# Patient Record
Sex: MALE | Race: BLACK OR AFRICAN AMERICAN | URBAN - METROPOLITAN AREA
[De-identification: names, ages, dates, MRNs, and addresses within clinical notes are randomized per-mention and may not be internally consistent; named-entity substitution may affect disease eponyms.]

---

## 2018-10-30 ENCOUNTER — OFFICE VISIT (OUTPATIENT)
Dept: INTERNAL MEDICINE CLINIC | Facility: CLINIC | Age: 28
End: 2018-10-30
Payer: COMMERCIAL

## 2018-10-30 VITALS
HEART RATE: 75 BPM | SYSTOLIC BLOOD PRESSURE: 118 MMHG | DIASTOLIC BLOOD PRESSURE: 64 MMHG | OXYGEN SATURATION: 98 % | BODY MASS INDEX: 31.32 KG/M2 | HEIGHT: 65 IN | WEIGHT: 188 LBS

## 2018-10-30 DIAGNOSIS — R51.9 HEADACHE DISORDER: ICD-10-CM

## 2018-10-30 DIAGNOSIS — Z00.00 GENERAL MEDICAL EXAM: Primary | ICD-10-CM

## 2018-10-30 DIAGNOSIS — J45.909 MODERATE ASTHMA WITHOUT COMPLICATION, UNSPECIFIED WHETHER PERSISTENT: ICD-10-CM

## 2018-10-30 PROCEDURE — 99213 OFFICE O/P EST LOW 20 MIN: CPT | Performed by: INTERNAL MEDICINE

## 2018-10-30 PROCEDURE — 36415 COLL VENOUS BLD VENIPUNCTURE: CPT | Performed by: INTERNAL MEDICINE

## 2018-10-30 RX ORDER — ALBUTEROL SULFATE 2.5 MG/3ML
SOLUTION RESPIRATORY (INHALATION) EVERY 6 HOURS PRN
COMMUNITY

## 2018-10-30 RX ORDER — NAPROXEN 500 MG/1
500 TABLET ORAL 2 TIMES DAILY WITH MEALS
Qty: 1 TABLET | Refills: 0 | Status: SHIPPED | OUTPATIENT
Start: 2018-10-30 | End: 2018-11-13

## 2018-10-30 RX ORDER — BUDESONIDE AND FORMOTEROL FUMARATE DIHYDRATE 80; 4.5 UG/1; UG/1
2 AEROSOL RESPIRATORY (INHALATION) 2 TIMES DAILY
Qty: 1 INHALER | Refills: 12 | Status: SHIPPED | OUTPATIENT
Start: 2018-10-30 | End: 2019-04-22

## 2018-10-30 RX ORDER — ALBUTEROL SULFATE 90 UG/1
2 AEROSOL, METERED RESPIRATORY (INHALATION) EVERY 6 HOURS PRN
Qty: 1 INHALER | Refills: 12 | Status: SHIPPED | OUTPATIENT
Start: 2018-10-30 | End: 2019-08-26

## 2018-10-30 RX ORDER — NAPROXEN 500 MG/1
500 TABLET ORAL 2 TIMES DAILY WITH MEALS
COMMUNITY
End: 2018-10-30

## 2018-10-30 RX ORDER — ALBUTEROL SULFATE 90 UG/1
2 AEROSOL, METERED RESPIRATORY (INHALATION) EVERY 6 HOURS PRN
COMMUNITY
End: 2018-10-30

## 2018-10-30 NOTE — PROGRESS NOTES
Opal Gallegos is a 34year old male. Patient presents with:  Establish Care  Lab: would like to get checked ffor STDs    HPI:   53-year-old male with a past medical history of asthma, stress headache here to establish care.   Patient was seeing me in [facility] Psychiatric/Behavioral: Negative for behavioral problems. Wt Readings from Last 5 Encounters:  10/30/18 : 188 lb (85.3 kg)    Body mass index is 31.28 kg/m².       EXAM:   /64 (BP Location: Left arm, Patient Position: Sitting, Cuff Size: adult) MCG/ACT Inhalation Aero Soln; Inhale 2 puffs into the lungs every 6 (six) hours as needed for Wheezing.  -     Budesonide-Formoterol Fumarate (SYMBICORT) 80-4.5 MCG/ACT Inhalation Aerosol; Inhale 2 puffs into the lungs 2 (two) times daily.       Plan: As ab

## 2018-10-30 NOTE — PATIENT INSTRUCTIONS
Please take the medications as prescribed  I started you on a new inhaler called Symbicort, please use that inhaler 2 puff in the morning and 2 puff at night irrespective of how you feel.   Please use the rescue inhaler albuterol when you need it  Asthma Tr windows in your home or car. · Have someone else do yard work, if possible.      Cockroaches. Roaches are found in many homes and produce allergens. · Keep your kitchen clean and dry. A leaky faucet or drain can attract roaches.   · Remove garbage from yo indoors. · Use pump spray bottles instead of aerosols. · Pour liquid  onto a rag or cloth instead of spraying them.      Weather. [place] them worse.   · Watch for very high or low temperatures, very humid c not been exercising regularly, start slow and work up gradually. · Take all of your medicines as prescribed. · If you use quick-relief medicine, make sure you have it with you when you exercise.   · Stop if you have any symptoms. Make sure you talk with y

## 2018-11-13 ENCOUNTER — TELEPHONE (OUTPATIENT)
Dept: INTERNAL MEDICINE CLINIC | Facility: CLINIC | Age: 28
End: 2018-11-13

## 2018-11-13 RX ORDER — NAPROXEN 500 MG/1
500 TABLET ORAL 2 TIMES DAILY WITH MEALS
Qty: 1 TABLET | Refills: 0 | Status: SHIPPED | OUTPATIENT
Start: 2018-11-13 | End: 2019-01-18

## 2018-11-13 NOTE — TELEPHONE ENCOUNTER
Current Outpatient Medications:     •  naproxen 500 MG Oral Tab, Take 1 tablet (500 mg total) by mouth 2 (two) times  daily with meals. , Disp: 1 tablet, Rfl: 0 REFILL

## 2019-01-02 NOTE — PROGRESS NOTES
Anastasiya Olivas is a 34year old male. Patient presents with:  Lab: pt states he is here for lab work, STDs  Patent had unprotected sex with another person he wants to be tested  HPI:   27-year-old male here for evaluation of STDs.   Patient reports that his Wt Readings from Last 5 Encounters:  01/02/19 : 194 lb (88 kg)  10/30/18 : 188 lb (85.3 kg)    Body mass index is 32.28 kg/m².       EXAM:   /72 (BP Location: Left arm, Patient Position: Sitting, Cuff Size: adult)   Pulse 81   Ht 5' 5\" (1.651 m)

## 2019-01-21 RX ORDER — NAPROXEN 500 MG/1
500 TABLET ORAL 2 TIMES DAILY WITH MEALS
Qty: 1 TABLET | Refills: 0 | Status: SHIPPED | OUTPATIENT
Start: 2019-01-21 | End: 2019-08-26

## 2019-02-15 ENCOUNTER — TELEPHONE (OUTPATIENT)
Dept: INTERNAL MEDICINE CLINIC | Facility: CLINIC | Age: 29
End: 2019-02-15

## 2019-04-18 ENCOUNTER — TELEPHONE (OUTPATIENT)
Dept: ADMINISTRATIVE | Age: 29
End: 2019-04-18

## 2019-04-18 ENCOUNTER — TELEPHONE (OUTPATIENT)
Dept: INTERNAL MEDICINE CLINIC | Facility: CLINIC | Age: 29
End: 2019-04-18

## 2019-04-18 NOTE — TELEPHONE ENCOUNTER
FMLA received via fax from [address]. Spoke to pt today and told him that a current appt will be needed before the FMLA can be started. Will need HIPAA/Fee. Emailed HIPAA packet to pt: [email or web address]

## 2019-04-22 ENCOUNTER — TELEPHONE (OUTPATIENT)
Dept: INTERNAL MEDICINE CLINIC | Facility: CLINIC | Age: 29
End: 2019-04-22

## 2019-04-22 ENCOUNTER — OFFICE VISIT (OUTPATIENT)
Dept: INTERNAL MEDICINE CLINIC | Facility: CLINIC | Age: 29
End: 2019-04-22
Payer: COMMERCIAL

## 2019-04-22 VITALS
OXYGEN SATURATION: 97 % | SYSTOLIC BLOOD PRESSURE: 153 MMHG | WEIGHT: 188 LBS | BODY MASS INDEX: 31.32 KG/M2 | HEART RATE: 77 BPM | HEIGHT: 65 IN | DIASTOLIC BLOOD PRESSURE: 70 MMHG

## 2019-04-22 DIAGNOSIS — I10 ESSENTIAL HYPERTENSION: Primary | ICD-10-CM

## 2019-04-22 PROBLEM — G43.009 MIGRAINE WITHOUT AURA AND WITHOUT STATUS MIGRAINOSUS, NOT INTRACTABLE: Status: ACTIVE | Noted: 2019-04-22

## 2019-04-22 PROCEDURE — 99213 OFFICE O/P EST LOW 20 MIN: CPT | Performed by: INTERNAL MEDICINE

## 2019-04-22 RX ORDER — AMLODIPINE BESYLATE 2.5 MG/1
2.5 TABLET ORAL DAILY
Qty: 90 TABLET | Refills: 0 | Status: SHIPPED | OUTPATIENT
Start: 2019-04-22 | End: 2019-07-18

## 2019-04-22 NOTE — TELEPHONE ENCOUNTER
[place] paper work completed by Dr Lyubov Covarrubias per patient to be faxed to: [facility]  Fax# [phone]    Faxed with confirmation sent to scanning.

## 2019-04-22 NOTE — PATIENT INSTRUCTIONS
As discussed, when I rechecked your blood pressure it was 144/94. Please avoid salt as much as possible. Exercise for 30-40 minutes 3-4 times a week. I have given you a medicine called amlodipine. Please take it once a day.     I will see you back in 3

## 2019-04-22 NOTE — PROGRESS NOTES
Kerry Pearson is a 34year old male. Patient presents with:  Headache: daily headaches for one week  Complete Form: needs FMLA paper filled out    HPI:   73-year-old male with history of migraine headaches, asthma here for follow-up.   He reports that he c problems. Wt Readings from Last 5 Encounters:  04/22/19 : 188 lb (85.3 kg)  01/02/19 : 194 lb (88 kg)  10/30/18 : 188 lb (85.3 kg)    Body mass index is 31.28 kg/m².       EXAM:   /70 (BP Location: Right arm, Patient Position: Sitting, Cuff Size:

## 2019-05-30 ENCOUNTER — OFFICE VISIT (OUTPATIENT)
Dept: INTERNAL MEDICINE CLINIC | Facility: CLINIC | Age: 29
End: 2019-05-30
Payer: COMMERCIAL

## 2019-05-30 VITALS
TEMPERATURE: 98 F | HEART RATE: 68 BPM | HEIGHT: 65 IN | WEIGHT: 189 LBS | BODY MASS INDEX: 31.49 KG/M2 | DIASTOLIC BLOOD PRESSURE: 75 MMHG | SYSTOLIC BLOOD PRESSURE: 127 MMHG

## 2019-05-30 DIAGNOSIS — R30.0 DYSURIA: Primary | ICD-10-CM

## 2019-05-30 PROBLEM — R51.9 HEADACHE DISORDER: Status: RESOLVED | Noted: 2018-10-30 | Resolved: 2019-05-30

## 2019-05-30 PROBLEM — Z00.00 GENERAL MEDICAL EXAM: Status: RESOLVED | Noted: 2018-10-30 | Resolved: 2019-05-30

## 2019-05-30 PROCEDURE — 81003 URINALYSIS AUTO W/O SCOPE: CPT | Performed by: INTERNAL MEDICINE

## 2019-05-30 PROCEDURE — 99213 OFFICE O/P EST LOW 20 MIN: CPT | Performed by: INTERNAL MEDICINE

## 2019-05-30 NOTE — PROGRESS NOTES
Jasmina Bernard is a 34year old male. Patient presents with:  Painful Urination: onset one week ago    HPI:   16-year-old gentleman with a past medical history of migraine, hypertension here for evaluation of dysuria.   He reports that he started to have bu dysuria. Negative for bladder incontinence, hematuria and flank pain. Skin: Negative for wound. Psychiatric/Behavioral: Negative for behavioral problems.      Wt Readings from Last 5 Encounters:  05/30/19 : 189 lb (85.7 kg)  04/22/19 : 188 lb (85.3 kg)

## 2019-06-19 ENCOUNTER — PATIENT MESSAGE (OUTPATIENT)
Dept: INTERNAL MEDICINE CLINIC | Facility: CLINIC | Age: 29
End: 2019-06-19

## 2019-06-20 RX ORDER — METRONIDAZOLE 500 MG/1
TABLET ORAL
Qty: 4 TABLET | Refills: 0 | Status: SHIPPED | OUTPATIENT
Start: 2019-06-20 | End: 2019-08-28

## 2019-06-20 NOTE — TELEPHONE ENCOUNTER
From: Ruby Kong  To: Macy Blas MD  Sent: 6/19/2019 1:29 PM CDT  Subject: Prescription Question    Good afternoon can you write me an prescription for the metronidazole again for trichomoniasis and also my last visit we talked about the bacteri

## 2019-07-18 RX ORDER — AMLODIPINE BESYLATE 2.5 MG/1
TABLET ORAL
Qty: 90 TABLET | Refills: 1 | Status: SHIPPED | OUTPATIENT
Start: 2019-07-18 | End: 2019-11-27

## 2019-07-18 NOTE — TELEPHONE ENCOUNTER
Refill passed per [facility] protocol.   Hypertensive Medications  Protocol Criteria:  · Appointment scheduled in the past 6 months or in the next 3 months  · BMP or CMP in the past 12 months  · Creatinine result < 2  Recent Outpatient Visits

## 2019-08-26 ENCOUNTER — TELEPHONE (OUTPATIENT)
Dept: INTERNAL MEDICINE CLINIC | Facility: CLINIC | Age: 29
End: 2019-08-26

## 2019-08-27 ENCOUNTER — PATIENT MESSAGE (OUTPATIENT)
Dept: INTERNAL MEDICINE CLINIC | Facility: CLINIC | Age: 29
End: 2019-08-27

## 2019-08-27 RX ORDER — ALBUTEROL SULFATE 90 UG/1
2 AEROSOL, METERED RESPIRATORY (INHALATION) EVERY 6 HOURS PRN
Qty: 3 INHALER | Refills: 1 | Status: SHIPPED | OUTPATIENT
Start: 2019-08-27 | End: 2020-01-13

## 2019-08-28 RX ORDER — METRONIDAZOLE 500 MG/1
TABLET ORAL
Qty: 4 TABLET | Refills: 0 | Status: SHIPPED | OUTPATIENT
Start: 2019-08-28 | End: 2019-11-27 | Stop reason: ALTCHOICE

## 2019-08-28 RX ORDER — NAPROXEN 500 MG/1
500 TABLET ORAL 2 TIMES DAILY WITH MEALS
Qty: 1 TABLET | Refills: 0 | Status: SHIPPED | OUTPATIENT
Start: 2019-08-28 | End: 2019-09-05

## 2019-08-28 NOTE — TELEPHONE ENCOUNTER
Refill passed per [facility] protocol.   Refill Protocol Appointment Criteria  · Appointment scheduled in the past 6 months or in the next 3 months  Recent Outpatient Visits            2 months ago [address], [address] [facility], [facility]

## 2019-08-28 NOTE — TELEPHONE ENCOUNTER
From: Toni Lagunas  To: Jai Askew MD  Sent: 8/27/2019 7:23 PM CDT  Subject: Prescription Question    Good Evening my girl friend have received her results from her gynecologist today and it said that she has trichomonas and that I need to be test

## 2019-08-28 NOTE — TELEPHONE ENCOUNTER
Please advise in regards to refill request. Thank You  Refill Protocol Appointment Criteria  · Appointment scheduled in the past 6 months or in the next 3 months  Recent Outpatient Visits            2 months ago [address], [address] [place] [address]

## 2019-08-28 NOTE — TELEPHONE ENCOUNTER
Dr. Michael Krishnan: patient uploaded a copy of his girlfriend's results. He asked if you can treat him?

## 2019-08-28 NOTE — TELEPHONE ENCOUNTER
I have sent the prescription to the pharmacy. He should take Flagyl 500 mg, take 4 tablets once. Please advise patient to use condoms when sexually active.   Thank you

## 2019-08-29 NOTE — TELEPHONE ENCOUNTER
Pharmacy sent in form asking for clarifications on Naproxen - Only dispense ONE tablet? Not 60 tablets? They received rx for; Naproxen 500mg 1 tab PO BID w/meals Quantity: 1tablet No refills. Please advise.

## 2019-08-29 NOTE — TELEPHONE ENCOUNTER
Spoke to RPh: Fausto Escalera to clarify that script was to be for 60tablets. RPh confirmed Naproxen 500mg 1 tab PO BID w/meals #60 w/0refills. They will get this ready for the pt.

## 2019-09-01 ENCOUNTER — TELEPHONE (OUTPATIENT)
Dept: OTHER | Age: 29
End: 2019-09-01

## 2019-09-02 NOTE — TELEPHONE ENCOUNTER
RN please contact patient to assess    Marianne Abdullahi MD 9 hours ago (12:17 PM)         Goodafternoon I been having the runs since yesterday I didn't go to the Emergency room because I didn't know how severe it was.

## 2019-09-03 NOTE — TELEPHONE ENCOUNTER
How is he doing? Can you please get me more info. He should be well hydrated .  Please let me know Thanks  Brown Keating

## 2019-09-04 NOTE — TELEPHONE ENCOUNTER
FERNIETCYUMIKO. Pt has viewed my chart message requesting he contact the office.       Keith Delatorre RN   to Teressa Heath           9/1/19 9:54 PM   Thank you for reaching out to our office regarding your symptoms. [facility] office is currently closed an

## 2019-09-05 RX ORDER — NAPROXEN 500 MG/1
500 TABLET ORAL 2 TIMES DAILY WITH MEALS
Qty: 90 TABLET | Refills: 1 | Status: SHIPPED | OUTPATIENT
Start: 2019-09-05 | End: 2019-11-27

## 2019-09-05 NOTE — TELEPHONE ENCOUNTER
Patient returned call, stated \"I'm doing fine now. My diarrhea is gone, maybe it was a medicine or something I ate. Oh, and can someone send Naproxen to my pharmacy because someone sent it but only on pill? \"    Script pended, please advise thanks

## 2019-11-27 ENCOUNTER — MED REC SCAN ONLY (OUTPATIENT)
Dept: INTERNAL MEDICINE CLINIC | Facility: CLINIC | Age: 29
End: 2019-11-27

## 2019-11-27 ENCOUNTER — OFFICE VISIT (OUTPATIENT)
Dept: INTERNAL MEDICINE CLINIC | Facility: CLINIC | Age: 29
End: 2019-11-27
Payer: COMMERCIAL

## 2019-11-27 VITALS
HEART RATE: 84 BPM | SYSTOLIC BLOOD PRESSURE: 131 MMHG | DIASTOLIC BLOOD PRESSURE: 71 MMHG | HEIGHT: 65 IN | BODY MASS INDEX: 32.15 KG/M2 | WEIGHT: 193 LBS | OXYGEN SATURATION: 94 %

## 2019-11-27 DIAGNOSIS — G43.009 MIGRAINE WITHOUT AURA AND WITHOUT STATUS MIGRAINOSUS, NOT INTRACTABLE: Primary | ICD-10-CM

## 2019-11-27 DIAGNOSIS — I10 ESSENTIAL HYPERTENSION: ICD-10-CM

## 2019-11-27 PROCEDURE — 99213 OFFICE O/P EST LOW 20 MIN: CPT | Performed by: INTERNAL MEDICINE

## 2019-11-27 RX ORDER — NAPROXEN 500 MG/1
500 TABLET ORAL 2 TIMES DAILY WITH MEALS
Qty: 90 TABLET | Refills: 1 | Status: SHIPPED | OUTPATIENT
Start: 2019-11-27 | End: 2020-01-13

## 2019-11-27 RX ORDER — AMLODIPINE BESYLATE 2.5 MG/1
2.5 TABLET ORAL
Qty: 90 TABLET | Refills: 1 | Status: SHIPPED | OUTPATIENT
Start: 2019-11-27 | End: 2020-04-21

## 2019-11-27 NOTE — PROGRESS NOTES
Radha Baeza is a 34year old male. Patient presents with:  Hypertension  Complete Form: FMLA  Headache: onset 6 days ago    HPI:   68-year-old male with a past medical history of migraine headaches, hypertension here for follow-up.   He reports that he c breath. Cardiovascular: Negative for chest pain. Gastrointestinal: Negative for vomiting, abdominal pain and abdominal distention. Genitourinary: Negative for hematuria. Skin: Negative for wound. Neurological: Positive for headaches.    Psychiatr

## 2019-12-08 ENCOUNTER — PATIENT MESSAGE (OUTPATIENT)
Dept: INTERNAL MEDICINE CLINIC | Facility: CLINIC | Age: 29
End: 2019-12-08

## 2019-12-09 NOTE — TELEPHONE ENCOUNTER
From: Jasmina Bernard  To: Parviz Munoz MD  Sent: 12/8/2019 9:17 AM CST  Subject: Other    Good morning can I get all my grandmother Frank Stanley prescriptions refilled for her to go to [facility] this week I couldn't ask in my chart for her because I didn

## 2020-01-14 RX ORDER — ALBUTEROL SULFATE 90 UG/1
2 AEROSOL, METERED RESPIRATORY (INHALATION) EVERY 6 HOURS PRN
Qty: 3 INHALER | Refills: 1 | Status: SHIPPED | OUTPATIENT
Start: 2020-01-14 | End: 2020-08-19

## 2020-01-14 RX ORDER — NAPROXEN 500 MG/1
500 TABLET ORAL 2 TIMES DAILY WITH MEALS
Qty: 90 TABLET | Refills: 1 | Status: SHIPPED | OUTPATIENT
Start: 2020-01-14 | End: 2020-04-21

## 2020-01-14 NOTE — TELEPHONE ENCOUNTER
11-27-19 # 90 + 1    Refill Protocol Appointment Criteria  · Appointment scheduled in the past 12 months or in the next 3 months  Recent Outpatient Visits            1 month ago Migraine without aura and without status migrainosus, not intractable    El

## 2020-02-19 ENCOUNTER — OFFICE VISIT (OUTPATIENT)
Dept: INTERNAL MEDICINE CLINIC | Facility: CLINIC | Age: 30
End: 2020-02-19
Payer: COMMERCIAL

## 2020-02-19 VITALS
HEART RATE: 64 BPM | TEMPERATURE: 98 F | HEIGHT: 65 IN | OXYGEN SATURATION: 97 % | DIASTOLIC BLOOD PRESSURE: 81 MMHG | WEIGHT: 190 LBS | SYSTOLIC BLOOD PRESSURE: 137 MMHG | BODY MASS INDEX: 31.65 KG/M2

## 2020-02-19 DIAGNOSIS — Z00.00 ADULT GENERAL MEDICAL EXAM: ICD-10-CM

## 2020-02-19 DIAGNOSIS — Z23 NEED FOR DTAP VACCINATION: ICD-10-CM

## 2020-02-19 DIAGNOSIS — I10 ESSENTIAL HYPERTENSION: Primary | ICD-10-CM

## 2020-02-19 DIAGNOSIS — G43.009 MIGRAINE WITHOUT AURA AND WITHOUT STATUS MIGRAINOSUS, NOT INTRACTABLE: ICD-10-CM

## 2020-02-19 DIAGNOSIS — J45.909 MODERATE ASTHMA WITHOUT COMPLICATION, UNSPECIFIED WHETHER PERSISTENT: ICD-10-CM

## 2020-02-19 PROCEDURE — 90471 IMMUNIZATION ADMIN: CPT | Performed by: INTERNAL MEDICINE

## 2020-02-19 PROCEDURE — 90715 TDAP VACCINE 7 YRS/> IM: CPT | Performed by: INTERNAL MEDICINE

## 2020-02-19 PROCEDURE — 99395 PREV VISIT EST AGE 18-39: CPT | Performed by: INTERNAL MEDICINE

## 2020-02-19 NOTE — PROGRESS NOTES
Carlos Dickens is a 31year old male. Patient presents with:  Physical    HPI:   82-year-old gentleman with past medical history of migraine, asthma here for physical.  He has no complaints. He only takes his pro-air inhaler only during flareups.   He does Constitutional: Negative for appetite change, fever and unexpected weight change. HENT: Negative for congestion, ear pain, nosebleeds and sore throat. Eyes: Negative for pain. Respiratory: Negative for cough, chest tightness and wheezing.     Josiah [place] Abdominal: Soft. Bowel sounds are normal. He exhibits no distension. There is no tenderness. There is no guarding. Lymphadenopathy:     He has no cervical adenopathy. Neurological: He is alert and oriented to person, place, and time.  He has normal st

## 2020-02-19 NOTE — PATIENT INSTRUCTIONS
ASSESSMENT AND PLAN:   1. Adult general medical exam  Encourage patient to eat healthy with fruits and vegetables. Avoid too much of sweets and carbohydrates. I encourage patient exercise for 30 to 40 minutes 3-4 times a week. Screenings: None.   Vac

## 2020-04-13 ENCOUNTER — TELEPHONE (OUTPATIENT)
Dept: INTERNAL MEDICINE CLINIC | Facility: CLINIC | Age: 30
End: 2020-04-13

## 2020-04-13 NOTE — TELEPHONE ENCOUNTER
Pt will be emailing [facility] recert forms. Would like it faxed when completed to his HR and then attach copy to his [employer]. HIPAA on file til 4/22/2020.

## 2020-04-21 RX ORDER — AMLODIPINE BESYLATE 2.5 MG/1
2.5 TABLET ORAL
Qty: 90 TABLET | Refills: 1 | Status: SHIPPED | OUTPATIENT
Start: 2020-04-21 | End: 2020-10-30

## 2020-04-21 RX ORDER — NAPROXEN 500 MG/1
500 TABLET ORAL 2 TIMES DAILY WITH MEALS
Qty: 90 TABLET | Refills: 1 | Status: SHIPPED | OUTPATIENT
Start: 2020-04-21 | End: 2020-08-19

## 2020-04-27 NOTE — TELEPHONE ENCOUNTER
Dr. Charles Hernandez,    Please sign off on form:  Re Cert FMLA - Migraines 1-2 flare ups per month    -Highlight the patient and hit \"Chart\" button. -In Chart Review, w/in the Encounter tab - click 1 time on the Telephone call encounter for 4/13/2020.  Scroll do

## 2020-04-27 NOTE — TELEPHONE ENCOUNTER
AGNIESZKA completed and faxed to [address] [place] [address] [phone].  Sent pt [employer] message

## 2020-05-22 ENCOUNTER — PATIENT MESSAGE (OUTPATIENT)
Dept: INTERNAL MEDICINE CLINIC | Facility: CLINIC | Age: 30
End: 2020-05-22

## 2020-05-22 ENCOUNTER — NURSE TRIAGE (OUTPATIENT)
Dept: INTERNAL MEDICINE CLINIC | Facility: CLINIC | Age: 30
End: 2020-05-22

## 2020-05-22 RX ORDER — IBUPROFEN 800 MG/1
800 TABLET ORAL EVERY 12 HOURS PRN
Qty: 10 TABLET | Refills: 0 | Status: SHIPPED | OUTPATIENT
Start: 2020-05-22 | End: 2020-05-27

## 2020-05-22 NOTE — TELEPHONE ENCOUNTER
From: Herlinda Bruno  To: Sabra Randall MD  Sent: 5/22/2020 12:11 PM CDT  Subject: Prescription Question    Good Afternoon i have severe pain in my teeth my dentist is closed due to the virus can I get a prescription for some pain meds.

## 2020-05-22 NOTE — TELEPHONE ENCOUNTER
Please triage.  Thank you.  ----- Message from Tanisha Benítez sent at 5/22/2020 12:11 PM CDT -----  Regarding: Prescription Question  Contact: [phone]  Good Afternoon i have severe pain in my teeth my dentist is closed due to the virus can I get a pre

## 2020-05-22 NOTE — TELEPHONE ENCOUNTER
Motrin 800 mg to take every 12 hours on an as-needed basis for pain sent to the pharmacy. Dispense 10 -  tablets please make sure that he eats something before he takes the medicine.

## 2020-05-22 NOTE — TELEPHONE ENCOUNTER
Action Requested: Summary for Provider     []  Critical Lab, Recommendations Needed  [x] Need Additional Advice  []   FYI    []   Need Orders  [x] Need Medications Sent to Pharmacy  []  Other     SUMMARY:   Spoke with pt,  verified, pt c/o tooth ache fo

## 2020-08-20 RX ORDER — ALBUTEROL SULFATE 90 UG/1
2 AEROSOL, METERED RESPIRATORY (INHALATION) EVERY 6 HOURS PRN
Qty: 3 INHALER | Refills: 1 | Status: SHIPPED | OUTPATIENT
Start: 2020-08-20 | End: 2021-01-06

## 2020-08-20 RX ORDER — NAPROXEN 500 MG/1
500 TABLET ORAL 2 TIMES DAILY WITH MEALS
Qty: 90 TABLET | Refills: 1 | Status: SHIPPED | OUTPATIENT
Start: 2020-08-20 | End: 2020-10-29

## 2020-10-08 ENCOUNTER — OFFICE VISIT (OUTPATIENT)
Dept: INTERNAL MEDICINE CLINIC | Facility: CLINIC | Age: 30
End: 2020-10-08
Payer: COMMERCIAL

## 2020-10-08 ENCOUNTER — MED REC SCAN ONLY (OUTPATIENT)
Dept: INTERNAL MEDICINE CLINIC | Facility: CLINIC | Age: 30
End: 2020-10-08

## 2020-10-08 VITALS
BODY MASS INDEX: 32.99 KG/M2 | HEART RATE: 69 BPM | WEIGHT: 198 LBS | DIASTOLIC BLOOD PRESSURE: 75 MMHG | HEIGHT: 65 IN | OXYGEN SATURATION: 98 % | SYSTOLIC BLOOD PRESSURE: 131 MMHG | TEMPERATURE: 98 F

## 2020-10-08 DIAGNOSIS — Z00.00 ADULT GENERAL MEDICAL EXAM: Primary | ICD-10-CM

## 2020-10-08 DIAGNOSIS — Z23 NEED FOR PNEUMOCOCCAL VACCINATION: ICD-10-CM

## 2020-10-08 DIAGNOSIS — Z23 NEED FOR INFLUENZA VACCINATION: ICD-10-CM

## 2020-10-08 PROCEDURE — 99395 PREV VISIT EST AGE 18-39: CPT | Performed by: INTERNAL MEDICINE

## 2020-10-08 PROCEDURE — 3008F BODY MASS INDEX DOCD: CPT | Performed by: INTERNAL MEDICINE

## 2020-10-08 PROCEDURE — 90471 IMMUNIZATION ADMIN: CPT | Performed by: INTERNAL MEDICINE

## 2020-10-08 PROCEDURE — 90732 PPSV23 VACC 2 YRS+ SUBQ/IM: CPT | Performed by: INTERNAL MEDICINE

## 2020-10-08 PROCEDURE — 90686 IIV4 VACC NO PRSV 0.5 ML IM: CPT | Performed by: INTERNAL MEDICINE

## 2020-10-08 PROCEDURE — 3078F DIAST BP <80 MM HG: CPT | Performed by: INTERNAL MEDICINE

## 2020-10-08 PROCEDURE — 90472 IMMUNIZATION ADMIN EACH ADD: CPT | Performed by: INTERNAL MEDICINE

## 2020-10-08 PROCEDURE — 3075F SYST BP GE 130 - 139MM HG: CPT | Performed by: INTERNAL MEDICINE

## 2020-10-08 NOTE — PROGRESS NOTES
Dontrell Ames is a 31year old male. Patient presents with:  Physical: work/forms    HPI:   51-year-old gentleman with past medical history of asthma, migraine, hypertension here for physical.  He has no complaints.   He continues to see neurology at [facility] REVIEW OF SYSTEMS:     Review of Systems   Constitutional: Negative for appetite change, fever and unexpected weight change. HENT: Negative for congestion, ear pain, nosebleeds and sore throat. Eyes: Negative for pain.    Respiratory: Negative for sounds are normal. He exhibits no distension. There is no abdominal tenderness. There is no rebound and no guarding. Lymphadenopathy:     He has no cervical adenopathy. Neurological: He is alert and oriented to person, place, and time.  No cranial nerve

## 2020-10-29 ENCOUNTER — NURSE TRIAGE (OUTPATIENT)
Dept: INTERNAL MEDICINE CLINIC | Facility: CLINIC | Age: 30
End: 2020-10-29

## 2020-10-29 ENCOUNTER — PATIENT MESSAGE (OUTPATIENT)
Dept: INTERNAL MEDICINE CLINIC | Facility: CLINIC | Age: 30
End: 2020-10-29

## 2020-10-29 RX ORDER — PENICILLIN V POTASSIUM 500 MG/1
500 TABLET ORAL 4 TIMES DAILY
Qty: 28 TABLET | Refills: 0 | Status: SHIPPED | OUTPATIENT
Start: 2020-10-29 | End: 2021-01-19

## 2020-10-29 NOTE — TELEPHONE ENCOUNTER
Action Requested: Summary for Provider     []  Critical Lab, Recommendations Needed  [x] Need Additional Advice  []   FYI    []   Need Orders  [x] Need Medications Sent to Pharmacy  []  Other     SUMMARY:   Spoke with pt,  verified, pt stated he has too

## 2020-10-29 NOTE — TELEPHONE ENCOUNTER
Patient informed of provider's response below that Rx was sent as requested. Pt thankful and denies further concerns at this time.

## 2020-10-29 NOTE — TELEPHONE ENCOUNTER
From: Salvatore Freeman  To: Eli Sheppard MD  Sent: 10/29/2020 7:41 AM CDT  Subject: Prescription Question    Good morning my dentist off gave me a prescription for penicillin Vk 500 MG 28 tablets in July for tooth infection my apt isn't until November 25

## 2020-10-29 NOTE — TELEPHONE ENCOUNTER
----- Message from Anastasiya Olivas sent at 10/29/2020  7:41 AM CDT -----  Regarding: Prescription Question  Contact: [phone]  Good morning my dentist off gave me a prescription for penicillin Vk 500 MG 28 tablets in July for tooth infection my apt isn

## 2020-10-30 RX ORDER — AMLODIPINE BESYLATE 2.5 MG/1
TABLET ORAL
Qty: 90 TABLET | Refills: 1 | Status: SHIPPED | OUTPATIENT
Start: 2020-10-30 | End: 2021-04-15

## 2020-10-30 RX ORDER — NAPROXEN 500 MG/1
500 TABLET ORAL 2 TIMES DAILY WITH MEALS
Qty: 90 TABLET | Refills: 1 | Status: SHIPPED | OUTPATIENT
Start: 2020-10-30 | End: 2021-01-19

## 2020-11-03 NOTE — PATIENT INSTRUCTIONS
7 11 19 LUCENTIS AND REPEATE IN 9 WKS - MILD EDEMA AT 13 WKS - RETX AND SHORTEN F/U BACK TO 9 WKS. As discussed, your urine test did not show any blood or infection. Please continue to drink plenty of fluids. If your symptoms did not resolve in the next 1 week, please let me know.   If everything is good, I will see you back in October for a physical.

## 2021-01-06 RX ORDER — ALBUTEROL SULFATE 90 UG/1
2 AEROSOL, METERED RESPIRATORY (INHALATION) EVERY 6 HOURS PRN
Qty: 3 INHALER | Refills: 1 | Status: SHIPPED | OUTPATIENT
Start: 2021-01-06 | End: 2021-05-24

## 2021-01-19 ENCOUNTER — OFFICE VISIT (OUTPATIENT)
Dept: INTERNAL MEDICINE CLINIC | Facility: CLINIC | Age: 31
End: 2021-01-19
Payer: COMMERCIAL

## 2021-01-19 VITALS
DIASTOLIC BLOOD PRESSURE: 70 MMHG | HEART RATE: 74 BPM | RESPIRATION RATE: 14 BRPM | OXYGEN SATURATION: 98 % | HEIGHT: 65 IN | WEIGHT: 202 LBS | BODY MASS INDEX: 33.66 KG/M2 | SYSTOLIC BLOOD PRESSURE: 120 MMHG

## 2021-01-19 DIAGNOSIS — R09.82 PND (POST-NASAL DRIP): Primary | ICD-10-CM

## 2021-01-19 PROCEDURE — 3074F SYST BP LT 130 MM HG: CPT | Performed by: INTERNAL MEDICINE

## 2021-01-19 PROCEDURE — 3078F DIAST BP <80 MM HG: CPT | Performed by: INTERNAL MEDICINE

## 2021-01-19 PROCEDURE — 99213 OFFICE O/P EST LOW 20 MIN: CPT | Performed by: INTERNAL MEDICINE

## 2021-01-19 PROCEDURE — 3008F BODY MASS INDEX DOCD: CPT | Performed by: INTERNAL MEDICINE

## 2021-01-19 RX ORDER — NAPROXEN 500 MG/1
500 TABLET ORAL 2 TIMES DAILY WITH MEALS
Qty: 60 TABLET | Refills: 0 | Status: SHIPPED | OUTPATIENT
Start: 2021-01-19 | End: 2021-02-18

## 2021-01-19 RX ORDER — MONTELUKAST SODIUM 10 MG/1
10 TABLET ORAL DAILY
Qty: 30 TABLET | Refills: 0 | Status: SHIPPED | OUTPATIENT
Start: 2021-01-19 | End: 2021-02-10

## 2021-01-19 NOTE — PROGRESS NOTES
Dontrell Ames is a 31year old male. Patient presents with:  Chest Pain: since 2 weeks ago     HPI:   62-year-old gentleman here for evaluation of foreign body sensation in the throat.   He reports that for the last 2 weeks he started to have a feeling of shortness of breath. Cardiovascular: Negative for chest pain. Gastrointestinal: Negative for vomiting, abdominal pain and abdominal distention. Genitourinary: Negative for hematuria. Skin: Negative for wound.    Psychiatric/Behavioral: Negative for

## 2021-02-10 RX ORDER — MONTELUKAST SODIUM 10 MG/1
TABLET ORAL
Qty: 30 TABLET | Refills: 0 | Status: SHIPPED | OUTPATIENT
Start: 2021-02-10 | End: 2021-04-22 | Stop reason: ALTCHOICE

## 2021-02-18 ENCOUNTER — PATIENT MESSAGE (OUTPATIENT)
Dept: INTERNAL MEDICINE CLINIC | Facility: CLINIC | Age: 31
End: 2021-02-18

## 2021-02-18 ENCOUNTER — NURSE TRIAGE (OUTPATIENT)
Dept: INTERNAL MEDICINE CLINIC | Facility: CLINIC | Age: 31
End: 2021-02-18

## 2021-02-18 NOTE — TELEPHONE ENCOUNTER
Action Requested: Summary for Provider     []  Critical Lab, Recommendations Needed  [] Need Additional Advice  [x]   FYI    []   Need Orders  [] Need Medications Sent to Pharmacy  []  Other     SUMMARY: Pt c/o severe headache, sensitivity to light and tito

## 2021-02-18 NOTE — TELEPHONE ENCOUNTER
From: Marshal Gresham  To: Oc Baird MD  Sent: 2/18/2021 12:27 PM CST  Subject: Other    GoodAfternoon I been vomiting and having a headache since Sunday the vomit only happens after I eat and the headache goes away after I take something and comes b

## 2021-02-18 NOTE — TELEPHONE ENCOUNTER
Routed to the triage pool for RN follow-up.         ----- Message from Anna Speaker sent at 2/18/2021 12:27 PM CST -----  Regarding: Other  Contact: [phone]  GoodAfternoon I been vomiting and having a headache since Sunday the vomit only happens af

## 2021-02-22 NOTE — TELEPHONE ENCOUNTER
Noted pt did send a [facility] message to update us. Pt did go to ER. No further action is needed.   Tuyet Risen  to Matt Mckeon RN          11:30 AM  GoodMorning I called the number back yesterday I went to the ER because my chest was hurting but to

## 2021-04-15 RX ORDER — AMLODIPINE BESYLATE 2.5 MG/1
2.5 TABLET ORAL DAILY
Qty: 90 TABLET | Refills: 1 | Status: SHIPPED | OUTPATIENT
Start: 2021-04-15 | End: 2022-01-20

## 2021-04-22 ENCOUNTER — OFFICE VISIT (OUTPATIENT)
Dept: INTERNAL MEDICINE CLINIC | Facility: CLINIC | Age: 31
End: 2021-04-22
Payer: COMMERCIAL

## 2021-04-22 VITALS
DIASTOLIC BLOOD PRESSURE: 81 MMHG | BODY MASS INDEX: 33.18 KG/M2 | HEART RATE: 67 BPM | SYSTOLIC BLOOD PRESSURE: 128 MMHG | WEIGHT: 199.13 LBS | HEIGHT: 65 IN

## 2021-04-22 DIAGNOSIS — G43.009 MIGRAINE WITHOUT AURA AND WITHOUT STATUS MIGRAINOSUS, NOT INTRACTABLE: ICD-10-CM

## 2021-04-22 DIAGNOSIS — J45.909 MODERATE ASTHMA WITHOUT COMPLICATION, UNSPECIFIED WHETHER PERSISTENT: ICD-10-CM

## 2021-04-22 DIAGNOSIS — I10 ESSENTIAL HYPERTENSION: Primary | ICD-10-CM

## 2021-04-22 DIAGNOSIS — Z72.51 HIGH RISK SEXUAL BEHAVIOR, UNSPECIFIED TYPE: ICD-10-CM

## 2021-04-22 PROCEDURE — 99214 OFFICE O/P EST MOD 30 MIN: CPT | Performed by: INTERNAL MEDICINE

## 2021-04-22 PROCEDURE — 3079F DIAST BP 80-89 MM HG: CPT | Performed by: INTERNAL MEDICINE

## 2021-04-22 PROCEDURE — 3008F BODY MASS INDEX DOCD: CPT | Performed by: INTERNAL MEDICINE

## 2021-04-22 PROCEDURE — 3074F SYST BP LT 130 MM HG: CPT | Performed by: INTERNAL MEDICINE

## 2021-04-22 RX ORDER — NAPROXEN 500 MG/1
500 TABLET ORAL 2 TIMES DAILY WITH MEALS
Qty: 60 TABLET | Refills: 1 | Status: SHIPPED | OUTPATIENT
Start: 2021-04-22 | End: 2021-05-22

## 2021-04-22 RX ORDER — DOXYCYCLINE HYCLATE 100 MG
100 TABLET ORAL 2 TIMES DAILY
Qty: 14 TABLET | Refills: 0 | Status: SHIPPED | OUTPATIENT
Start: 2021-04-22 | End: 2021-04-29

## 2021-04-22 NOTE — PROGRESS NOTES
Chandni Cochran is a 36year old male. Patient presents with:  Forms Completion: pt stts he has pain on his penis. pt would like forms that he brought to be completed.     HPI:   74-year-old gentleman with a past medical history of migraine headaches, asthma Vaping Use: Never used    Alcohol use: Yes      Comment: rarely    Drug use: No     No family history on file. No Known Allergies     REVIEW OF SYSTEMS:     Review of Systems   Constitutional: Negative for activity change, appetite change and fever.    HE Behavior: Behavior normal.            ASSESSMENT AND PLAN:   1. Essential hypertension  Well-controlled. We will continue the current medical regimen. Check labs  - COMP METABOLIC PANEL (14)  - CBC, PLATELET; NO DIFFERENTIAL    2.  Moderate asthma without

## 2021-04-30 ENCOUNTER — OFFICE VISIT (OUTPATIENT)
Dept: INTERNAL MEDICINE CLINIC | Facility: CLINIC | Age: 31
End: 2021-04-30
Payer: COMMERCIAL

## 2021-04-30 VITALS
HEIGHT: 65 IN | DIASTOLIC BLOOD PRESSURE: 88 MMHG | SYSTOLIC BLOOD PRESSURE: 120 MMHG | HEART RATE: 96 BPM | BODY MASS INDEX: 33.15 KG/M2 | RESPIRATION RATE: 14 BRPM | OXYGEN SATURATION: 98 % | WEIGHT: 199 LBS

## 2021-04-30 DIAGNOSIS — R36.9 URETHRAL DISCHARGE IN MALE: Primary | ICD-10-CM

## 2021-04-30 PROCEDURE — 3074F SYST BP LT 130 MM HG: CPT | Performed by: INTERNAL MEDICINE

## 2021-04-30 PROCEDURE — 3079F DIAST BP 80-89 MM HG: CPT | Performed by: INTERNAL MEDICINE

## 2021-04-30 PROCEDURE — 99213 OFFICE O/P EST LOW 20 MIN: CPT | Performed by: INTERNAL MEDICINE

## 2021-04-30 PROCEDURE — 3008F BODY MASS INDEX DOCD: CPT | Performed by: INTERNAL MEDICINE

## 2021-04-30 NOTE — PROGRESS NOTES
Shari Pimentel is a 36year old male. Patient presents with: Follow - Up    HPI:   24-year-old gentleman here for urethral discharge. Reports that Monday he had a urethral discharge he got better after he drank plenty of fluids. It was whitish in color. urinating, dysuria, enuresis, flank pain, frequency, genital sores, hematuria, penile pain, penile swelling and testicular pain. Skin: Negative for wound. Psychiatric/Behavioral: Negative for behavioral problems.       Wt Readings from Last 5 Encounters patient indicates understanding of these issues and agrees to the plan. No follow-ups on file.

## 2021-05-24 RX ORDER — ALBUTEROL SULFATE 90 UG/1
2 AEROSOL, METERED RESPIRATORY (INHALATION) EVERY 6 HOURS PRN
Qty: 3 INHALER | Refills: 1 | Status: SHIPPED | OUTPATIENT
Start: 2021-05-24 | End: 2021-07-05

## 2021-07-07 RX ORDER — ALBUTEROL SULFATE 90 UG/1
2 AEROSOL, METERED RESPIRATORY (INHALATION) EVERY 6 HOURS PRN
Qty: 3 EACH | Refills: 2 | Status: SHIPPED | OUTPATIENT
Start: 2021-07-07 | End: 2021-10-05

## 2021-12-15 ENCOUNTER — OFFICE VISIT (OUTPATIENT)
Dept: INTERNAL MEDICINE CLINIC | Facility: CLINIC | Age: 31
End: 2021-12-15
Payer: COMMERCIAL

## 2021-12-15 VITALS
SYSTOLIC BLOOD PRESSURE: 120 MMHG | OXYGEN SATURATION: 92 % | HEART RATE: 96 BPM | DIASTOLIC BLOOD PRESSURE: 72 MMHG | RESPIRATION RATE: 14 BRPM | WEIGHT: 200 LBS | HEIGHT: 65 IN | BODY MASS INDEX: 33.32 KG/M2

## 2021-12-15 DIAGNOSIS — Z00.00 ADULT GENERAL MEDICAL EXAM: Primary | ICD-10-CM

## 2021-12-15 PROCEDURE — 3008F BODY MASS INDEX DOCD: CPT | Performed by: INTERNAL MEDICINE

## 2021-12-15 PROCEDURE — 3074F SYST BP LT 130 MM HG: CPT | Performed by: INTERNAL MEDICINE

## 2021-12-15 PROCEDURE — 3078F DIAST BP <80 MM HG: CPT | Performed by: INTERNAL MEDICINE

## 2021-12-15 PROCEDURE — 99395 PREV VISIT EST AGE 18-39: CPT | Performed by: INTERNAL MEDICINE

## 2021-12-15 RX ORDER — ALBUTEROL SULFATE 90 UG/1
2 AEROSOL, METERED RESPIRATORY (INHALATION) EVERY 4 HOURS PRN
Qty: 1 EACH | Refills: 3 | Status: SHIPPED | OUTPATIENT
Start: 2021-12-15

## 2021-12-15 NOTE — PROGRESS NOTES
Tanisha Benítez is a 36year old male. Patient presents with:  Forms Completion: AGNIESZKA   Physical    HPI:   28-year-old gentleman with past medical history of asthma, migraine, hypertension here for physical.   Overall he is doing okay.   He was seen by audio Known Allergies     REVIEW OF SYSTEMS:   Review of Systems   Review of Systems   Constitutional: Negative for activity change, appetite change and fever. HENT: Negative for congestion and voice change.     Respiratory: Negative for cough and shortness of exam  Encourage patient to eat healthy with fruits and vegetables. Avoid too much of sweets and carbohydrates. I encourage patient exercise for 30 to 40 minutes 3-4 times a week.   Screenings: none indicated   Vaccines: He is up-to-date on Covid vaccine s

## 2022-01-20 RX ORDER — AMLODIPINE BESYLATE 2.5 MG/1
2.5 TABLET ORAL DAILY
Qty: 90 TABLET | Refills: 1 | Status: SHIPPED | OUTPATIENT
Start: 2022-01-20 | End: 2022-08-07

## 2022-01-20 NOTE — TELEPHONE ENCOUNTER
[facility]     Requested Prescriptions   Pending Prescriptions Disp Refills    AMLODIPINE 2.5 MG Oral Tab [Pharmacy Med Name: AMLODIPINE BESYLATE 2.5 MG TAB] 90 tablet 1     Sig: TAKE 1 TABLET BY MOUTH EVERY DAY        Hypertensive Medications Protocol Passed - 1/20/2022 12:07 AM        Passed - CMP or BMP in past 12 months        Passed - Appointment in past 6 or next 3 months        Passed - GFR  > 50     Lab Results   Component Value Date    GFRAA 111 05/11/2021                           Recent Outpatient Visits              1 month ago Adult general medical exam    Nereida Noel MD    Office Visit    8 months ago Urethral discharge in male    [facility], [address], Nereida Olguin MD    Office Visit    9 months ago Essential hypertension    [facility], [address], Nereida Olguin MD    Office Visit    1 year ago PND (post-nasal drip)    [facility], [address], Nereida Olguin MD    Office Visit    1 year ago Adult general medical exam    Nereida Noel MD    Office Visit

## 2022-03-11 LAB
ALBUMIN/GLOBULIN RATIO: 1.4 (CALC) (ref 1–2.5)
ALBUMIN: 4.8 G/DL (ref 3.6–5.1)
ALKALINE PHOSPHATASE: 45 U/L (ref 36–130)
ALT: 13 U/L (ref 9–46)
AST: 22 U/L (ref 10–40)
BILIRUBIN, TOTAL: 0.6 MG/DL (ref 0.2–1.2)
BUN: 14 MG/DL (ref 7–25)
CALCIUM: 9.9 MG/DL (ref 8.6–10.3)
CARBON DIOXIDE: 29 MMOL/L (ref 20–32)
CHLAMYDIA TRACHOMATIS$RNA, TMA: NOT DETECTED
CHLORIDE: 100 MMOL/L (ref 98–110)
CHOL/HDLC RATIO: 3 (CALC)
CHOLESTEROL, TOTAL: 193 MG/DL
CREATININE: 0.98 MG/DL (ref 0.6–1.35)
EGFR IF AFRICN AM: 119 ML/MIN/1.73M2
EGFR IF NONAFRICN AM: 103 ML/MIN/1.73M2
GLOBULIN: 3.4 G/DL (CALC) (ref 1.9–3.7)
GLUCOSE: 79 MG/DL (ref 65–99)
HDL CHOLESTEROL: 65 MG/DL
HEMATOCRIT: 45.2 % (ref 38.5–50)
HEMOGLOBIN A1C: 5.2 % OF TOTAL HGB
HEMOGLOBIN: 15.6 G/DL (ref 13.2–17.1)
LDL-CHOLESTEROL: 111 MG/DL (CALC)
MCH: 30 PG (ref 27–33)
MCHC: 34.5 G/DL (ref 32–36)
MCV: 86.9 FL (ref 80–100)
MPV: 10.2 FL (ref 7.5–12.5)
NEISSERIA GONORRHOEAE$RNA, TMA: NOT DETECTED
NON-HDL CHOLESTEROL: 128 MG/DL (CALC)
PLATELET COUNT: 298 THOUSAND/UL (ref 140–400)
POTASSIUM: 3.7 MMOL/L (ref 3.5–5.3)
PROTEIN, TOTAL: 8.2 G/DL (ref 6.1–8.1)
RDW: 12.8 % (ref 11–15)
RED BLOOD CELL COUNT: 5.2 MILLION/UL (ref 4.2–5.8)
SODIUM: 138 MMOL/L (ref 135–146)
TRIGLYCERIDES: 81 MG/DL
TSH W/REFLEX TO FT4: 0.86 MIU/L (ref 0.4–4.5)
WHITE BLOOD CELL COUNT: 9.3 THOUSAND/UL (ref 3.8–10.8)

## 2022-06-21 ENCOUNTER — OFFICE VISIT (OUTPATIENT)
Dept: INTERNAL MEDICINE CLINIC | Facility: CLINIC | Age: 32
End: 2022-06-21
Payer: COMMERCIAL

## 2022-06-21 VITALS
DIASTOLIC BLOOD PRESSURE: 86 MMHG | OXYGEN SATURATION: 98 % | RESPIRATION RATE: 14 BRPM | HEART RATE: 90 BPM | HEIGHT: 65 IN | WEIGHT: 194 LBS | SYSTOLIC BLOOD PRESSURE: 134 MMHG | BODY MASS INDEX: 32.32 KG/M2

## 2022-06-21 DIAGNOSIS — N46.9 INFERTILITY MALE: ICD-10-CM

## 2022-06-21 DIAGNOSIS — Z00.00 ADULT GENERAL MEDICAL EXAM: Primary | ICD-10-CM

## 2022-06-21 PROCEDURE — 3008F BODY MASS INDEX DOCD: CPT | Performed by: INTERNAL MEDICINE

## 2022-06-21 PROCEDURE — 3075F SYST BP GE 130 - 139MM HG: CPT | Performed by: INTERNAL MEDICINE

## 2022-06-21 PROCEDURE — 99395 PREV VISIT EST AGE 18-39: CPT | Performed by: INTERNAL MEDICINE

## 2022-06-21 PROCEDURE — 3079F DIAST BP 80-89 MM HG: CPT | Performed by: INTERNAL MEDICINE

## 2022-08-07 RX ORDER — AMLODIPINE BESYLATE 2.5 MG/1
TABLET ORAL
Qty: 90 TABLET | Refills: 1 | Status: SHIPPED | OUTPATIENT
Start: 2022-08-07

## 2022-10-24 ENCOUNTER — OFFICE VISIT (OUTPATIENT)
Dept: SURGERY | Facility: CLINIC | Age: 32
End: 2022-10-24
Payer: COMMERCIAL

## 2022-10-24 VITALS — SYSTOLIC BLOOD PRESSURE: 120 MMHG | HEART RATE: 83 BPM | DIASTOLIC BLOOD PRESSURE: 82 MMHG

## 2022-10-24 DIAGNOSIS — Z78.9 ATTEMPTING TO CONCEIVE: Primary | ICD-10-CM

## 2022-10-24 RX ORDER — NAPROXEN 500 MG/1
500 TABLET ORAL 2 TIMES DAILY WITH MEALS
COMMUNITY

## 2022-10-24 NOTE — PROGRESS NOTES
[facility] Urology  Initial Office Consultation    HPI:   Kiel Aaron is a 29year old male here today for consultation at the request of, and a copy of this note will be sent to, Oc Mcduffie MD.    1. Attempting to Conceive  Patient presents for further evaluation of difficulty conceiving with his girlfriend. Patient reports attempting to conceive with his girlfriend (30 years old) for the past 2 years. They have been sexually active about 2-3 times per week without using any contraception. No pregnancies achieved. His girlfriend/sexual partner reportedly has 2 children from previous relationship. She has been reportedly worked up and no concerns noted    Patient has never tried any sexual partner pregnant previously. He reports normal libido and denies erectile dysfunction failure    No history of testicular or genital trauma, surgery, or radiation. No history of mumps orchitis. No history of STIs. STI testing 3/2022 was negative. Minimal to no LUTS. No previous semen analyses. No fevers or chills. No testicular pain or swelling. No chest pain or shortness of breath. No gross hematuria or dysuria. No penile or urethral discharge. No constipation or diarrhea. PAST MEDICAL HISTORY: Hypertension and asthma. PAST SURGICAL HISTORY: None. SOCIAL HISTORY: Interrelationship and has no biological children. No smoking or illicit drug use. Rare occasional social cigar smoking. Social alcohol use. 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 History reviewed. No pertinent surgical history. History reviewed. No pertinent family history. Allergies: Patient has no known allergies. REVIEW OF SYSTEMS:  Pertinent positives and negatives per HPI. A 12-point ROS was performed and is otherwise negative.        EXAM:  /82 (BP Location: Left arm, Patient Position: Sitting, Cuff Size: adult)   Pulse 83     Physical Exam  Constitutional:       General: He is not in acute distress. Appearance: He is well-developed. HENT:      Head: Normocephalic and atraumatic. Eyes:      General: No scleral icterus. Cardiovascular:      Rate and Rhythm: Normal rate. Pulmonary:      Effort: Pulmonary effort is normal.   Abdominal:      General: There is no distension. Palpations: Abdomen is soft. Tenderness: There is no abdominal tenderness. Genitourinary:     Penis: Circumcised. No erythema, discharge or lesions. Testes: Normal.         Right: Mass, tenderness, swelling, testicular hydrocele or varicocele not present. Left: Mass, tenderness, swelling, testicular hydrocele or varicocele not present. Epididymis:      Right: Normal.      Left: Normal.      Comments: Normal spermatic cords bilaterally. Vasa palpable bilaterally. Patient declined JOHNSON. Musculoskeletal:         General: Normal range of motion. Cervical back: Neck supple. Skin:     General: Skin is warm and dry. Neurological:      Mental Status: He is alert and oriented to person, place, and time. Psychiatric:         Mood and Affect: Mood normal.         Behavior: Behavior normal.       LABS:  No results found. IMAGING:  No results found. IMPRESSION:  29year old -American male with difficulty conceiving with his 75-year-old girlfriend following 2 years of unprotected sexual intercourse. Findings reviewed with patient at length. Relevant anatomy and physiology discussed. Recommend obtaining a semen analysis for initial evaluation. Discussed sexual abstinence for 2 to 5 days before submitting sample. Patient will be notified of the results. All questions answered. PLAN:  1. Obtain fertility semen analysis following 2 to 5 days of sexual abstinence. Patient will be notified of results.       Macy Chow MD  10/24/2022

## 2022-10-29 RX ORDER — NAPROXEN 500 MG/1
500 TABLET ORAL 2 TIMES DAILY WITH MEALS
Qty: 90 TABLET | Refills: 0 | Status: SHIPPED | OUTPATIENT
Start: 2022-10-29

## 2022-10-29 RX ORDER — AMLODIPINE BESYLATE 2.5 MG/1
2.5 TABLET ORAL DAILY
Qty: 90 TABLET | Refills: 1 | Status: SHIPPED | OUTPATIENT
Start: 2022-10-29

## 2022-10-29 NOTE — TELEPHONE ENCOUNTER
Protocol failed or has No Protocol, please review  Requested Prescriptions   Pending Prescriptions Disp Refills    amLODIPine 2.5 MG Oral Tab 90 tablet 1     Sig: Take 1 tablet (2.5 mg total) by mouth in the morning. Hypertensive Medications Protocol Failed - 10/28/2022  7:07 PM        Failed - CMP or BMP in past 6 months     No results found for this or any previous visit (from the past 4392 hour(s)).             Passed - In person appointment in the past 12 or next 3 months     Recent Outpatient Visits              5 days ago Attempting to conceive    [facility] for [address] [place] [address], Vladimir Rodriguez MD    Office Visit    4 months ago Adult general medical exam    [facility], Nereida Olguin MD    Office Visit    10 months ago Adult general medical exam    [address], [address], Nereida Olguin MD    Office Visit    1 year ago Urethral discharge in male    [address], [address], Nereida Olguin MD    Office Visit    1 year ago Essential hypertension    [facility], [address], Nereida Olguin MD    Office Visit                      Passed - Last BP reading less than 140/90     BP Readings from Last 1 Encounters:  10/24/22 : 120/82              Passed - In person appointment or virtual visit in the past 6 months     Recent Outpatient Visits              5 days ago Attempting to conceive    [facility] for Eliot Burt MD    Office Visit    4 months ago Adult general medical exam    [address], [address], Nereida Olguin MD    Office Visit    10 months ago Adult general medical exam    [address] [place], [address], Nereida Olguin MD    Office Visit    1 year ago Urethral discharge in male    [address], [address], Nereida Olguin MD    Office Visit    1 year ago Essential hypertension    [address], [address], Andre Lorenzo MD    Office Visit                      Passed [facility] or GFRAA > 50     GFR Evaluation  GFRAA: 119 , resulted on 3/10/2022            naproxen 500 MG Oral Tab  0     Sig: Take 1 tablet (500 mg total) by mouth in the morning and 1 tablet (500 mg total) in the evening. Take with meals.        Non-Narcotic Pain Medication Protocol Passed - 10/28/2022  7:07 PM        Passed - In person appointment or virtual visit in the past 6 mos or appointment in next 3 mos     Recent Outpatient Visits              5 days ago Attempting to conceive    [facility] for [place], [place], Lucila Wang MD    Office Visit    4 months ago Adult general medical exam    [facility], Andre Lorenzo MD    Office Visit    10 months ago Adult general medical exam    [address], Andre Lorenzo MD    Office Visit    1 year ago Urethral discharge in male    [address] [place], Andre Lorenzo MD    Office Visit    1 year ago Essential hypertension    [address], Andre Lorenzo MD    Office Visit                           Recent Outpatient Visits              5 days ago Attempting to conceive    [facility] for Rashawn Almanza MD    Office Visit    4 months ago Adult general medical exam    [address], Andre Lorenzo MD    Office Visit    10 months ago Adult general medical exam    [address], Andre Lorenzo MD    Office Visit    1 year ago Urethral discharge in male    [address] [place], Andre Lorenzo MD    Office Visit    1 year ago Essential hypertension    [address], Andre Lorenzo MD    Office Visit

## 2022-10-29 NOTE — TELEPHONE ENCOUNTER
Protocol failed or has No Protocol, please review  Requested Prescriptions   Pending Prescriptions Disp Refills    amLODIPine 2.5 MG Oral Tab 90 tablet 1     Sig: Take 1 tablet (2.5 mg total) by mouth in the morning. Hypertensive Medications Protocol Failed - 10/28/2022  7:07 PM        Failed - CMP or BMP in past 6 months     No results found for this or any previous visit (from the past 4392 hour(s)).             Passed - In person appointment in the past 12 or next 3 months     Recent Outpatient Visits              5 days ago Attempting to conceive    [facility] for [address] [place] [address], Rosa Callejas MD    Office Visit    4 months ago Adult general medical exam    [address] [place] [address], Angel Luis Hendrix MD    Office Visit    10 months ago Adult general medical exam    [facility] [address], Angel Luis Hendrix MD    Office Visit    1 year ago Urethral discharge in male    [facility] [address], Angel Luis Hendrix MD    Office Visit    1 year ago Essential hypertension    [facility], [address] Luis MD    Office Visit                      Passed - Last BP reading less than 140/90     BP Readings from Last 1 Encounters:  10/24/22 : 120/82              Passed - In person appointment or virtual visit in the past 6 months     Recent Outpatient Visits              5 days ago Attempting to conceive    [facility] for Holland Ponce MD    Office Visit    4 months ago Adult general medical exam    [facility] [address] Luis MD    Office Visit    10 months ago Adult general medical exam    [facility] [address] Luis MD    Office Visit    1 year ago Urethral discharge in male    [facility] [address] Luis MD    Office Visit    1 year ago Essential hypertension    [facility] [address], Debbra Gaucher, MD    Office Visit                      Passed [facility] or GFRAA > 50     GFR Evaluation  GFRAA: 119 , resulted on 3/10/2022            naproxen 500 MG Oral Tab  0     Sig: Take 1 tablet (500 mg total) by mouth in the morning and 1 tablet (500 mg total) in the evening. Take with meals.        Non-Narcotic Pain Medication Protocol Passed - 10/28/2022  7:07 PM        Passed - In person appointment or virtual visit in the past 6 mos or appointment in next 3 mos     Recent Outpatient Visits              5 days ago Attempting to conceive    [facility], [address], Machelle Mejia MD    Office Visit    4 months ago Adult general medical exam    [address] [facility], Debbra Gaucher, MD    Office Visit    10 months ago Adult general medical exam    [facility], [address], Debbra Gaucher, MD    Office Visit    1 year ago Urethral discharge in male    [facility], [address], Debbra Gaucher, MD    Office Visit    1 year ago Essential hypertension    [facility], [address], Debbra Gaucher, MD    Office Visit                           Recent Outpatient Visits              5 days ago Attempting to conceive    [facility] for Vikas Julien MD    Office Visit    4 months ago Adult general medical exam    [facility], [address], Debbra Gaucher, MD    Office Visit    10 months ago Adult general medical exam    [facility], [address], Debbra Gaucher, MD    Office Visit    1 year ago Urethral discharge in male    [facility], [address], Debbra Gaucher, MD    Office Visit    1 year ago Essential hypertension    [address] [facility], Debbra Gaucher, MD    Office Visit

## 2023-01-03 RX ORDER — AMLODIPINE BESYLATE 2.5 MG/1
2.5 TABLET ORAL DAILY
Qty: 90 TABLET | Refills: 1 | Status: SHIPPED | OUTPATIENT
Start: 2023-01-03

## 2023-01-03 RX ORDER — NAPROXEN 500 MG/1
500 TABLET ORAL 2 TIMES DAILY WITH MEALS
Qty: 90 TABLET | Refills: 0 | Status: SHIPPED | OUTPATIENT
Start: 2023-01-03

## 2023-03-03 ENCOUNTER — OFFICE VISIT (OUTPATIENT)
Facility: CLINIC | Age: 33
End: 2023-03-03

## 2023-03-03 VITALS
BODY MASS INDEX: 32.65 KG/M2 | WEIGHT: 196 LBS | HEIGHT: 65 IN | SYSTOLIC BLOOD PRESSURE: 114 MMHG | HEART RATE: 70 BPM | DIASTOLIC BLOOD PRESSURE: 86 MMHG

## 2023-03-03 DIAGNOSIS — G43.009 MIGRAINE WITHOUT AURA AND WITHOUT STATUS MIGRAINOSUS, NOT INTRACTABLE: Primary | ICD-10-CM

## 2023-03-03 DIAGNOSIS — J45.909 MODERATE ASTHMA WITHOUT COMPLICATION, UNSPECIFIED WHETHER PERSISTENT: ICD-10-CM

## 2023-03-03 DIAGNOSIS — I10 ESSENTIAL HYPERTENSION: ICD-10-CM

## 2023-03-03 PROCEDURE — 3074F SYST BP LT 130 MM HG: CPT | Performed by: INTERNAL MEDICINE

## 2023-03-03 PROCEDURE — 3008F BODY MASS INDEX DOCD: CPT | Performed by: INTERNAL MEDICINE

## 2023-03-03 PROCEDURE — 3079F DIAST BP 80-89 MM HG: CPT | Performed by: INTERNAL MEDICINE

## 2023-03-03 PROCEDURE — 99214 OFFICE O/P EST MOD 30 MIN: CPT | Performed by: INTERNAL MEDICINE

## 2023-03-03 RX ORDER — MELOXICAM 15 MG/1
15 TABLET ORAL DAILY
Qty: 15 TABLET | Refills: 0 | Status: SHIPPED | OUTPATIENT
Start: 2023-03-03 | End: 2023-03-18

## 2023-06-12 RX ORDER — ALBUTEROL SULFATE 90 UG/1
2 AEROSOL, METERED RESPIRATORY (INHALATION) EVERY 4 HOURS PRN
Qty: 1 EACH | Refills: 3 | Status: SHIPPED | OUTPATIENT
Start: 2023-06-12

## 2023-06-12 RX ORDER — AMLODIPINE BESYLATE 2.5 MG/1
2.5 TABLET ORAL DAILY
Qty: 90 TABLET | Refills: 1 | Status: SHIPPED | OUTPATIENT
Start: 2023-06-12

## 2023-06-12 NOTE — TELEPHONE ENCOUNTER
Refill passed per [facility] protocol. Requested Prescriptions   Pending Prescriptions Disp Refills    amLODIPine 2.5 MG Oral Tab 90 tablet 1     Sig: Take 1 tablet (2.5 mg total) by mouth daily. Hypertensive Medications Protocol Failed - 6/11/2023  8:56 PM        Failed - CMP or BMP in past 6 months     No results found for this or any previous visit (from the past 4392 hour(s)).               Failed - EGFRCR or GFRAA > 50     GFR Evaluation              Passed - In person appointment in the past 12 or next 3 months     Recent Outpatient Visits              3 months ago Migraine without aura and without status migrainosus, not intractable    [facility], [facility], Yogesh Estes MD    Office Visit    7 months ago Attempting to conceive    [address], [address], Madalyn Winslow MD    Office Visit    11 months ago Adult general medical exam    [address], [address], Yogesh Estes MD    Office Visit    1 year ago Adult general medical exam    [address], [address], Yogesh Estes MD    Office Visit    2 years ago Urethral discharge in male    [address] [facility], Memo Savage MD    Office Visit                      Passed - Last BP reading less than 140/90     BP Readings from Last 1 Encounters:  03/03/23 : 114/86                Passed - In person appointment or virtual visit in the past 6 months     Recent Outpatient Visits              3 months ago Migraine without aura and without status migrainosus, not intractable    Yogesh Rollins MD    Office Visit    7 months ago Attempting to conceive    Margarita Carrington MD    Office Visit    11 months ago Adult general medical exam    [address], [address], Tony Mcnally MD    Office Visit    1 year ago Adult general medical exam    [address] [facility], Tony Mcnally MD    Office Visit    2 years ago Urethral discharge in male    [address] [facility], Chuy Meier MD    Office Visit                        albuterol (PROAIR HFA) 108 (90 Base) MCG/ACT Inhalation Aero Soln 1 each 3     Sig: Inhale 2 puffs into the lungs every 4 (four) hours as needed for Wheezing.        Asthma & COPD Medication Protocol Passed - 6/11/2023  8:56 PM        Passed - In person appointment or virtual visit in the past 6 mos or appointment in next 3 mos     Recent Outpatient Visits              3 months ago Migraine without aura and without status migrainosus, not intractable    [facility], [address] [facility], Tony Mcnally MD    Office Visit    7 months ago Attempting to conceive    [facility], [address], Moraima Hernandez MD    Office Visit    11 months ago Adult general medical exam    [facility], [address], Tony Mcnally MD    Office Visit    1 year ago Adult general medical exam    [facility], [address], Tony Mcnally MD    Office Visit    2 years ago Urethral discharge in male    [address] [facility], Tony Mcnally MD    Office Visit                           Recent Outpatient Visits              3 months ago Migraine without aura and without status migrainosus, not intractable    [facility], Tony Mcnally MD    Office Visit    7 months ago Attempting to conceive    Lupe Garland MD    Office Visit    11 months ago Adult general medical exam    [address] [facility], Tony Mcnally MD    Office Visit    1 year ago Adult general medical exam    [address], [address] [facility],[address], Andre Lorenzo MD    Office Visit    2 years ago Urethral discharge in male    [address] [facility], Andre Lorenzo MD    Office Visit

## 2023-06-12 NOTE — TELEPHONE ENCOUNTER
Please review; protocol failed. Requested Prescriptions   Pending Prescriptions Disp Refills    amLODIPine 2.5 MG Oral Tab 90 tablet 1     Sig: Take 1 tablet (2.5 mg total) by mouth daily. Hypertensive Medications Protocol Failed - 6/11/2023  8:56 PM        Failed - CMP or BMP in past 6 months     No results found for this or any previous visit (from the past 4392 hour(s)).               Failed - EGFRCR or GFRAA > 50     GFR Evaluation              Passed - In person appointment in the past 12 or next 3 months     Recent Outpatient Visits              3 months ago Migraine without aura and without status migrainosus, not intractable    [facility], Parveen Barber, Carter Stapleton MD    Office Visit    7 months ago Attempting to conceive    [address], [address], Hunter Dunn MD    Office Visit    11 months ago Adult general medical exam    [address], [address], Carter Stapleton MD    Office Visit    1 year ago Adult general medical exam    [address], [address], Carter Stapleton MD    Office Visit    2 years ago Urethral discharge in male    [address] [facility], Hugh Chew MD    Office Visit                      Passed - Last BP reading less than 140/90     BP Readings from Last 1 Encounters:  03/03/23 : 114/86                Passed - In person appointment or virtual visit in the past 6 months     Recent Outpatient Visits              3 months ago Migraine without aura and without status migrainosus, not intractable    Carter Carrillo MD    Office Visit    7 months ago Attempting to conceive    Viktoria Perez MD    Office Visit    11 months ago Adult general medical exam    [address], [address], Hardeep Sera Wright MD    Office Visit    1 year ago Adult general medical exam    [address], [address], Andrzej Naylor MD    Office Visit    2 years ago Urethral discharge in male    [address] [facility], Sera Wright MD    Office Visit                       Signed Prescriptions Disp Refills    albuterol (PROAIR HFA) 108 (90 Base) MCG/ACT Inhalation Aero Soln 1 each 3     Sig: Inhale 2 puffs into the lungs every 4 (four) hours as needed for Wheezing.        Asthma & COPD Medication Protocol Passed - 6/11/2023  8:56 PM        Passed - In person appointment or virtual visit in the past 6 mos or appointment in next 3 mos     Recent Outpatient Visits              3 months ago Migraine without aura and without status migrainosus, not intractable    [facility], [address] [place] [address], Andrzej Naylor MD    Office Visit    7 months ago Attempting to conceive    [address], [address], Daniel Chow MD    Office Visit    11 months ago Adult general medical exam    [address], [address], Andrzej Naylor MD    Office Visit    1 year ago Adult general medical exam    [address], [address], Andrzej Naylor MD    Office Visit    2 years ago Urethral discharge in male    [facility], Andrzej Naylor MD    Office Visit                           Recent Outpatient Visits              3 months ago Migraine without aura and without status migrainosus, not intractable    [address] Wright, Andrzej Naylor MD    Office Visit    7 months ago Attempting to conceive    Yaima Pham MD    Office Visit    11 months ago Adult general medical exam    [address], [address] [facility] [address], Andzrej Naylor MD    Office Visit 1 year ago Adult general medical exam    [address], [address] [facility],[address], Mady Grimaldo MD    Office Visit    2 years ago Urethral discharge in male    [facility] Mari, Mady Grimaldo MD    Office Visit

## 2023-07-15 ENCOUNTER — PATIENT MESSAGE (OUTPATIENT)
Facility: CLINIC | Age: 33
End: 2023-07-15

## 2023-07-17 ENCOUNTER — NURSE TRIAGE (OUTPATIENT)
Dept: INTERNAL MEDICINE CLINIC | Facility: CLINIC | Age: 33
End: 2023-07-17

## 2023-07-17 NOTE — TELEPHONE ENCOUNTER
Action Requested: Summary for Provider     []  Critical Lab, Recommendations Needed  [] Need Additional Advice  []   FYI    []   Need Orders  [] Need Medications Sent to Pharmacy  []  Other     SUMMARY: Per protocol advised office visit. Patient requesting labs to rule out possible STD/UTI. Reason for call: Urinary Symptoms  Onset: Data Unavailable    Patient states for 2 days having dysuria with frequency. Patient denies hematuria, fever,discharge from penis, abdominal/back/flank pain, nausea, vomiting, lesions on penis.     Reason for Disposition   All other males with painful urination, or patient wants to be seen    Protocols used: Urination Pain - Male-A-OH

## 2023-07-17 NOTE — TELEPHONE ENCOUNTER
Patient states for two days now having dysuria and asking for a urine test to rule out UTI and or STD's. See Acute TE from today.

## 2023-07-18 NOTE — TELEPHONE ENCOUNTER
Spoke with patient, states he is at work on those times (230 pm or 330 pm ). States he is available NOW and around 1145 am, informed that will send message to DR Carmen Betts.           No future appointments.

## 2023-07-18 NOTE — TELEPHONE ENCOUNTER
Informed patient about the video appointment NOW and agrees.        Future Appointments   Date Time Provider Annalisa Nielsen   7/18/2023 10:00 AM Ian Garcia MD [facility]

## 2023-07-24 ENCOUNTER — TELEPHONE (OUTPATIENT)
Dept: INTERNAL MEDICINE CLINIC | Facility: CLINIC | Age: 33
End: 2023-07-24

## 2023-07-27 ENCOUNTER — TELEPHONE (OUTPATIENT)
Dept: INTERNAL MEDICINE CLINIC | Facility: CLINIC | Age: 33
End: 2023-07-27

## 2023-07-27 NOTE — TELEPHONE ENCOUNTER
Patient (name and  verified) calling stating that he is at [facility] to get his lab work completed and they do not have the orders. Per chart review, the Lab orders placed on 23 were ordered for [facility] by PCP. Verified with [address] [place] [address] that orders should be accessible for [facility].    Offered to fax the orders to [facility].  [phone]    Sent through [facility] [employer]-awaiting confirmation.

## 2023-07-27 NOTE — TELEPHONE ENCOUNTER
Patient states that he is at [facility]. They do not have his lab orders from 7/18/23. I faxed urinalysis, HIV, Chlamydia/Gonorrhea and urine culture to fax provided by patient.      Fax: [phone]

## 2023-10-31 RX ORDER — MELOXICAM 15 MG/1
15 TABLET ORAL
Qty: 15 TABLET | Refills: 0 | Status: SHIPPED | OUTPATIENT
Start: 2023-10-31

## 2023-10-31 NOTE — TELEPHONE ENCOUNTER
Refill passed per [facility] protocol. Requested Prescriptions   Pending Prescriptions Disp Refills    MELOXICAM 15 MG Oral Tab [Pharmacy Med Name: MELOXICAM 15 MG TABLET] 15 tablet 0     Sig: TAKE 1 TABLET BY MOUTH DAILY FOR 15 DAYS.  WITH MEALS       Non-Narcotic Pain Medication Protocol Passed - 10/30/2023 12:05 PM        Passed - In person appointment or virtual visit in the past 6 mos or appointment in next 3 mos     Recent Outpatient Visits              2 months ago Erika ADAN, [address] [place], [place] [address]Rossy MD    Telemedicine    3 months ago IliRossy clayton MD    Telemedicine    3 months ago High risk heterosexual behavior    [facility], [address] [place] [address], MD Rossy    Telemedicine    8 months ago Migraine without aura and without status migrainosus, not intractable    Rossy Gilliland MD    Office Visit    1 year ago Attempting to conceive    Lupe Garland MD    Office Visit                         Recent Outpatient Visits              2 months ago Erika ADAN, [address] [place], [place] [address]Rossy MD    Telemedicine    3 months ago Marisol Yanez Wagoner, MD    Telemedicine    3 months ago High risk heterosexual behavior    [facility], [address] [place] [address], MD Rossy    Telemedicine    8 months ago Migraine without aura and without status migrainosus, not intractable    Rossy Gilliland MD    Office Visit    1 year ago Attempting to conceive    Lupe Garland MD    Office Visit

## 2023-12-13 ENCOUNTER — OFFICE VISIT (OUTPATIENT)
Dept: INTERNAL MEDICINE CLINIC | Facility: CLINIC | Age: 33
End: 2023-12-13

## 2023-12-13 ENCOUNTER — NURSE TRIAGE (OUTPATIENT)
Dept: INTERNAL MEDICINE CLINIC | Facility: CLINIC | Age: 33
End: 2023-12-13

## 2023-12-13 VITALS
DIASTOLIC BLOOD PRESSURE: 77 MMHG | HEIGHT: 65 IN | BODY MASS INDEX: 33.52 KG/M2 | TEMPERATURE: 98 F | WEIGHT: 201.19 LBS | HEART RATE: 91 BPM | SYSTOLIC BLOOD PRESSURE: 124 MMHG

## 2023-12-13 DIAGNOSIS — R05.1 ACUTE COUGH: Primary | ICD-10-CM

## 2023-12-13 PROCEDURE — 3074F SYST BP LT 130 MM HG: CPT | Performed by: INTERNAL MEDICINE

## 2023-12-13 PROCEDURE — 3078F DIAST BP <80 MM HG: CPT | Performed by: INTERNAL MEDICINE

## 2023-12-13 PROCEDURE — 99213 OFFICE O/P EST LOW 20 MIN: CPT | Performed by: INTERNAL MEDICINE

## 2023-12-13 PROCEDURE — 3008F BODY MASS INDEX DOCD: CPT | Performed by: INTERNAL MEDICINE

## 2023-12-13 RX ORDER — DOXYCYCLINE HYCLATE 50 MG/1
50 CAPSULE ORAL 2 TIMES DAILY
Qty: 14 CAPSULE | Refills: 0 | Status: SHIPPED | OUTPATIENT
Start: 2023-12-13 | End: 2023-12-20

## 2023-12-13 RX ORDER — BENZONATATE 100 MG/1
100 CAPSULE ORAL 3 TIMES DAILY PRN
Qty: 20 CAPSULE | Refills: 0 | Status: SHIPPED | OUTPATIENT
Start: 2023-12-13

## 2023-12-13 NOTE — PATIENT INSTRUCTIONS
Please take doxycycline twice daily for 7 days, and use benzonatate 3 times daily as needed for your cough. Call if not better.

## 2023-12-13 NOTE — TELEPHONE ENCOUNTER
Action Requested: Summary for Provider     []  Critical Lab, Recommendations Needed  [] Need Additional Advice  []   FYI    []   Need Orders  [] Need Medications Sent to Pharmacy  []  Other     SUMMARY: Per protocol, patient should be seen in office within 3 days. Future Appointments   Date Time Provider Annalisa Nielsen   12/13/2023  1:30 PM Anjelica Wood MD [facility] [place]   12/22/2023 12:30 PM Brian Marshall MD [facility]     Reason for call: Cough/URI  Onset: Data Unavailable    Spoke to patient. He has had a cough and congestion for 3 weeks. He has chest pain when coughing. He denies fever or SOB. He has been trying over the counter medication with no relief. Appointment scheduled.      Reason for Disposition   Cough has been present for > 3 weeks    Protocols used: Cough-A-OH

## 2024-01-04 RX ORDER — AMLODIPINE BESYLATE 2.5 MG/1
2.5 TABLET ORAL DAILY
Qty: 90 TABLET | Refills: 3 | OUTPATIENT
Start: 2024-01-04

## 2024-01-05 RX ORDER — AMLODIPINE BESYLATE 2.5 MG/1
2.5 TABLET ORAL DAILY
Qty: 90 TABLET | Refills: 1 | Status: SHIPPED | OUTPATIENT
Start: 2024-01-05

## 2024-01-05 NOTE — TELEPHONE ENCOUNTER
Please review; protocol failed/ has no protocol      No active /future labs noted   Last office visit 12/13/2023    Last refill 06/12/2023 with 1 refill     Requested Prescriptions   Pending Prescriptions Disp Refills    amLODIPine 2.5 MG Oral Tab 90 tablet 1     Sig: Take 1 tablet (2.5 mg total) by mouth daily.       Hypertensive Medications Protocol Failed - 1/3/2024 11:15 AM        Failed - CMP or BMP in past 6 months     No results found for this or any previous visit (from the past 4392 hour(s)).            Failed - EGFRCR or GFRAA > 50     GFR Evaluation            Passed - In person appointment in the past 12 or next 3 months     Recent Outpatient Visits              3 weeks ago [facility]    [facility]    4 months ago [facility]    5 months ago Anxiety    [facility]    5 months ago High risk heterosexual behavior    [facility]    10 months ago Migraine without aura and without status migrainosus, not intractable    [facility]    [facility]                      Passed - Last BP reading less than 140/90     BP Readings from Last 1 Encounters:   12/13/23 124/77               Passed - In person appointment or virtual visit in the past 6 months     Recent Outpatient Visits              3 weeks ago Acute cough    [facility]    Office Visit    4 months ago Anxiety    [facility]    [facility]    5 months ago Anxiety    [facility] Jacob    Telemedicine    5 months ago High risk heterosexual behavior    [facility], Jacob Barros MD    Telemedicine    10 months ago Migraine without aura and without status migrainosus, not intractable    [facility], [facility], Jacob Barros MD    Office Visit                         Recent Outpatient Visits              3 weeks ago Acute cough    [facility]    Office Visit    4 months ago Anxiety    [facility], Jacob Barros MD    Telemedicine    5 months ago Anxiety    [facility], [facility], Jacob Barros MD    Telemedicine    5 months ago High risk heterosexual behavior    [facility], Jacob Barros MD    Telemedicine    10 months ago Migraine without aura and without status migrainosus, not intractable    [facility], [facility], Jacob Barros MD    Office Visit

## 2024-01-05 NOTE — TELEPHONE ENCOUNTER
Protocol Failed/ No Protocol    Requested Prescriptions   Pending Prescriptions Disp Refills    AMLODIPINE 2.5 MG Oral Tab [Pharmacy Med Name: AMLODIPINE BESYLATE 2.5 MG TAB] 90 tablet 1     Sig: TAKE 1 TABLET BY MOUTH EVERY DAY       Hypertensive Medications Protocol Failed - 1/3/2024 11:10 AM        Failed - CMP or BMP in past 6 months     No results found for this or any previous visit (from the past 4392 hour(s)).            Failed - EGFRCR or GFRAA > 50     GFR Evaluation            Passed - In person appointment in the past 12 or next 3 months     Recent Outpatient Visits              3 weeks ago [facility]    [facility]    5 months ago [facility]    [facility]    5 months ago High risk heterosexual behavior    [facility]    10 months ago Migraine without aura and without status migrainosus, not intractable    [facility]                      Passed - Last BP reading less than 140/90     BP Readings from Last 1 Encounters:   12/13/23 124/77               Passed - In person appointment or virtual visit in the past 6 months     Recent Outpatient Visits              3 weeks ago [facility] cough    [facility]    [facility]    4 months ago [facility]    [facility]    5 months ago An[facility]    [facility]    5 months ago High risk heterosexual behavior    [place]  [facility]    10 months ago Migraine without aura and without status migrainosus, not intractable    [facility]                             Recent Outpatient Visits              3 weeks ago Acute cough    [facility], Antonio Dyer MD    Office Visit    4 months ago Anxiety    [facility], Jacob Barros MD    Telemedicine    5 months ago Anxiety    [facility], Jacob Barros MD    Telemedicine    5 months ago High risk heterosexual behavior    [facility], Jacob Barros MD    Telemedicine    10 months ago Migraine without aura and without status migrainosus, not intractable    [facility], [facility], Jacob Barros MD    Office Visit

## 2024-04-13 ENCOUNTER — PATIENT MESSAGE (OUTPATIENT)
Facility: CLINIC | Age: 34
End: 2024-04-13

## 2024-04-15 ENCOUNTER — NURSE TRIAGE (OUTPATIENT)
Dept: INTERNAL MEDICINE CLINIC | Facility: CLINIC | Age: 34
End: 2024-04-15

## 2024-04-15 DIAGNOSIS — R30.9 URINARY PAIN: Primary | ICD-10-CM

## 2024-04-15 NOTE — TELEPHONE ENCOUNTER
From: Memo Patel  To: Jacob Whittaker  Sent: 4/13/2024 1:11 PM CDT  Subject: Pain     Good Afternoon I’m having pain in the penis when I use the restroom can I get a std/Sti and hiv testing with [facility].

## 2024-04-15 NOTE — TELEPHONE ENCOUNTER
Action Requested: Summary for Provider     []  Critical Lab, Recommendations Needed  [x] Need Additional Advice  []   FYI    [x]   Need Orders  [] Need Medications Sent to Pharmacy  []  Other     SUMMARY: Patient asking if  could place orders to [facility] for  STD testing. STD panel pended.  Patient states for past week has burning pain when urinating.     Patent denies fever, penile discharge, blood in urine, flank pain or any other symptoms. No known exposure to STD    Patient declined appointment at this time.     Reason for call: Sexually Transmitted Infection Screen and Urinary Symptoms  Onset: 1 week.  Reason for Disposition   All other males with painful urination, or patient wants to be seen    Protocols used: Urination Pain - Male-A-OH

## 2024-04-15 NOTE — TELEPHONE ENCOUNTER
I have approved it.  Please obtain verbal permission from patient for HIV testing.  Thank you  Please instruct him to use condoms all the time.

## 2024-04-17 LAB
CHLAMYDIA TRACHOMATIS$RNA, TMA: NOT DETECTED
NEISSERIA GONORRHOEAE$RNA, TMA: NOT DETECTED
T. PALLIDUM AB, EIA: NEGATIVE

## 2024-05-17 ENCOUNTER — OFFICE VISIT (OUTPATIENT)
Facility: CLINIC | Age: 34
End: 2024-05-17

## 2024-05-17 VITALS
HEIGHT: 65 IN | DIASTOLIC BLOOD PRESSURE: 80 MMHG | BODY MASS INDEX: 32.1 KG/M2 | SYSTOLIC BLOOD PRESSURE: 102 MMHG | WEIGHT: 192.63 LBS | OXYGEN SATURATION: 98 % | HEART RATE: 67 BPM

## 2024-05-17 DIAGNOSIS — Z00.00 ADULT GENERAL MEDICAL EXAM: ICD-10-CM

## 2024-05-17 DIAGNOSIS — I10 ESSENTIAL HYPERTENSION: Primary | ICD-10-CM

## 2024-05-17 DIAGNOSIS — J45.909 MODERATE ASTHMA WITHOUT COMPLICATION, UNSPECIFIED WHETHER PERSISTENT (HCC): ICD-10-CM

## 2024-05-17 DIAGNOSIS — G43.009 MIGRAINE WITHOUT AURA AND WITHOUT STATUS MIGRAINOSUS, NOT INTRACTABLE: ICD-10-CM

## 2024-05-17 PROCEDURE — 99214 OFFICE O/P EST MOD 30 MIN: CPT | Performed by: INTERNAL MEDICINE

## 2024-05-17 NOTE — PROGRESS NOTES
Memo Patel is a 34 year old male.  Chief Complaint   Patient presents with    Headache     headaches after changing diet, been having headaches for a while. Today has no headaches but does have body pains    Follow - Up     HPI:   34-year-old gentleman here for follow-up.  He reports that he is doing okay.  He is looking for a new neurologist as he cannot see Dr. Hanson anymore.  He is excited about raising his daughter.  Denies any chest pain shortness of breath abdominal pain nausea vomiting.  He reports groin pain.  No trauma no falls.      Current Outpatient Medications   Medication Sig Dispense Refill    amLODIPine 2.5 MG Oral Tab Take 1 tablet (2.5 mg total) by mouth daily. 90 tablet 1    Meloxicam 15 MG Oral Tab Take 1 tablet (15 mg total) by mouth daily as needed for Pain. 15 tablet 0    albuterol (PROAIR HFA) 108 (90 Base) MCG/ACT Inhalation Aero Soln Inhale 2 puffs into the lungs every 4 (four) hours as needed for Wheezing. 1 each 3    albuterol sulfate (2.5 MG/3ML) 0.083% Inhalation Nebu Soln Take by nebulization every 6 (six) hours as needed for Wheezing.        Past Medical History:    Asthma (HCC)    Essential hypertension    General medical exam    Headache disorder    Hearing loss    bilateral    Migraine      History reviewed. No pertinent surgical history.   Social History:  Social History     Socioeconomic History    Marital status: Single    Number of children: 0   Tobacco Use    Smoking status: Never     Passive exposure: Never    Smokeless tobacco: Never   Vaping Use    Vaping status: Never Used   Substance and Sexual Activity    Alcohol use: Yes     Comment: rarely    Drug use: No   Other Topics Concern     Service No    Blood Transfusions No    Caffeine Concern Yes     Comment: coffee,  energy drinks    Occupational Exposure No    Hobby Hazards No    Sleep Concern No    Stress Concern No    Weight Concern No    Special Diet No    Back Care No    Exercise No    Bike Helmet No     Seat Belt Yes    Self-Exams Yes     Social Determinants of Health     Financial Resource Strain: Low Risk  (10/30/2018)    [facility]    Overall Financial Resource Strain (CARDIA)     Difficulty of Paying Living Expenses: Not hard at all   Food Insecurity: No Food Insecurity (10/30/2018)    [facility]    Hunger Vital Sign     Worried About Running Out of Food in the Last Year: Never true     Ran Out of Food in the Last Year: Never true   Transportation Needs: No Transportation Needs (10/30/2018)    [facility]    PRAPARE - Transportation     Lack of Transportation (Medical): No     Lack of Transportation (Non-Medical): No      History reviewed. No pertinent family history.   No Known Allergies     REVIEW OF SYSTEMS:   Review of Systems   Review of Systems   Constitutional: Negative for activity change, appetite change and fever.   HENT: Negative for congestion and voice change.    Respiratory: Negative for cough and shortness of breath.    Cardiovascular: Negative for chest pain.   Gastrointestinal: Negative for abdominal distention, abdominal pain and vomiting.   Genitourinary: Negative for hematuria.   Skin: Negative for wound.   Psychiatric/Behavioral: Negative for behavioral problems.   Wt Readings from Last 5 Encounters:   05/17/24 192 lb 9.6 oz (87.4 kg)   12/13/23 201 lb 3.2 oz (91.3 kg)   03/03/23 196 lb (88.9 kg)   06/21/22 194 lb (88 kg)   12/15/21 200 lb (90.7 kg)     Body mass index is 32.05 kg/m².      EXAM:   /80   Pulse 67   Ht 5' 5\" (1.651 m)   Wt 192 lb 9.6 oz (87.4 kg)   SpO2 98%   BMI 32.05 kg/m²   Physical Exam   Constitutional:       Appearance: Normal appearance.   HENT:      Head: Normocephalic.   Eyes:      Conjunctiva/sclera: Conjunctivae normal.   Cardiovascular:      Rate and Rhythm: Normal rate and regular rhythm.      Heart sounds: Normal  heart sounds. No murmur heard.  Pulmonary:      Effort: Pulmonary effort is normal.      Breath sounds: Normal breath sounds. No rhonchi or rales.   Abdominal:      General: Bowel sounds are normal.      Palpations: Abdomen is soft.      Tenderness: There is no abdominal tenderness.   Musculoskeletal:      Cervical back: Neck supple.      Right lower leg: No edema.      Left lower leg: No edema.   Skin:     General: Skin is warm and dry.   Neurological:      General: No focal deficit present.      Mental Status: He is alert and oriented to person, place, and time. Mental status is at baseline.   Psychiatric:         Mood and Affect: Mood normal.         Behavior: Behavior normal.   No tenderness in the groin    ASSESSMENT AND PLAN:   1. Essential hypertension  Blood pressure is optimally controlled.  Continue current medical regimen.  Check kidney functions and thyroid functions.    2. Moderate asthma without complication, unspecified whether persistent (HCC)  Well-controlled with on and off flareups.  Continue current inhaler regimen.    3. Migraine without aura and without status migrainosus, not intractable  Stable.  Follows with neurology.    4. Adult general medical exam  I have ordered blood works as a part of his physical.  - CBC, Platelet; No Differential  - Comp Metabolic Panel (14)  - Hemoglobin A1C  - Lipid Panel  - TSH W Reflex To Free T4    Plan: As above.      The patient indicates understanding of these issues and agrees to the plan.  No follow-ups on file.    This note was prepared using Dragon Medical voice recognition dictation software. As a result errors may occur. When identified these errors have been corrected. While every attempt is made to correct errors during dictation discrepancies may still exist.

## 2024-05-21 RX ORDER — MELOXICAM 15 MG/1
15 TABLET ORAL
Qty: 15 TABLET | Refills: 0 | Status: SHIPPED | OUTPATIENT
Start: 2024-05-21

## 2024-05-21 NOTE — TELEPHONE ENCOUNTER
Refill passed per [facility] protocol.  However rx last approved 10/31/23 [id number]. Please advise if refill request appropriate?    Requested Prescriptions   Pending Prescriptions Disp Refills    Meloxicam 15 MG Oral Tab 15 tablet 0     Sig: Take 1 tablet (15 mg total) by mouth daily as needed for Pain.       Non-Narcotic Pain Medication Protocol Passed - 5/17/2024 10:05 PM        Passed - In person appointment or virtual visit in the past 6 mos or appointment in next 3 mos     Recent Outpatient Visits              3 days ago Essential hypertension    [facility] Jacob Barros MD    Office Visit    5 months ago Acute cough    [facility] Antonio Hill MD    Office Visit    9 months ago [facility]    9 months ago Anxiety    [facility] Jacob Whittaker MD    Telemedicine    10 months ago High risk heterosexual behavior    [facility]          Future Appointments         Provider Department Appt Notes    In 6 months Jacob Whittaker MD [facility] follow up

## 2024-07-03 ENCOUNTER — EMPLOYEE HEALTH (OUTPATIENT)
Dept: OTHER | Facility: HOSPITAL | Age: 34
End: 2024-07-03
Attending: PREVENTIVE MEDICINE

## 2024-07-03 DIAGNOSIS — Z11.1 SCREENING-PULMONARY TB: ICD-10-CM

## 2024-07-03 DIAGNOSIS — Z01.84 IMMUNITY STATUS TESTING: Primary | ICD-10-CM

## 2024-07-03 PROCEDURE — 86480 TB TEST CELL IMMUN MEASURE: CPT

## 2024-07-03 PROCEDURE — 86787 VARICELLA-ZOSTER ANTIBODY: CPT

## 2024-07-05 LAB
M TB IFN-G CD4+ T-CELLS BLD-ACNC: 0.01 IU/ML
M TB TUBERC IFN-G BLD QL: NEGATIVE
M TB TUBERC IGNF/MITOGEN IGNF CONTROL: >10 IU/ML
QFT TB1 AG MINUS NIL: 0 IU/ML
QFT TB2 AG MINUS NIL: 0 IU/ML
VZV IGG SER IA-ACNC: 2209 (ref 165–?)

## 2024-08-26 ENCOUNTER — OFFICE VISIT (OUTPATIENT)
Facility: LOCATION | Age: 34
End: 2024-08-26

## 2024-08-26 DIAGNOSIS — H91.8X9 OTHER SPECIFIED FORMS OF HEARING LOSS, UNSPECIFIED LATERALITY: Primary | ICD-10-CM

## 2024-08-26 DIAGNOSIS — H90.3 SENSORINEURAL HEARING LOSS, BILATERAL: ICD-10-CM

## 2025-03-26 ENCOUNTER — PATIENT MESSAGE (OUTPATIENT)
Dept: INTERNAL MEDICINE CLINIC | Facility: CLINIC | Age: 35
End: 2025-03-26

## 2025-03-27 ENCOUNTER — OFFICE VISIT (OUTPATIENT)
Dept: INTERNAL MEDICINE CLINIC | Facility: CLINIC | Age: 35
End: 2025-03-27

## 2025-03-27 VITALS
SYSTOLIC BLOOD PRESSURE: 136 MMHG | OXYGEN SATURATION: 97 % | WEIGHT: 203 LBS | HEIGHT: 65 IN | HEART RATE: 88 BPM | DIASTOLIC BLOOD PRESSURE: 88 MMHG | TEMPERATURE: 99 F | BODY MASS INDEX: 33.82 KG/M2

## 2025-03-27 DIAGNOSIS — N52.9 ERECTILE DYSFUNCTION, UNSPECIFIED ERECTILE DYSFUNCTION TYPE: ICD-10-CM

## 2025-03-27 DIAGNOSIS — J45.909 MODERATE ASTHMA WITHOUT COMPLICATION, UNSPECIFIED WHETHER PERSISTENT (HCC): ICD-10-CM

## 2025-03-27 DIAGNOSIS — I10 ESSENTIAL HYPERTENSION: ICD-10-CM

## 2025-03-27 DIAGNOSIS — Z00.00 ADULT GENERAL MEDICAL EXAM: Primary | ICD-10-CM

## 2025-03-27 PROCEDURE — 99214 OFFICE O/P EST MOD 30 MIN: CPT | Performed by: INTERNAL MEDICINE

## 2025-03-27 RX ORDER — AMLODIPINE BESYLATE 2.5 MG/1
2.5 TABLET ORAL DAILY
Qty: 90 TABLET | Refills: 1 | Status: SHIPPED | OUTPATIENT
Start: 2025-03-27

## 2025-03-27 RX ORDER — ALBUTEROL SULFATE 90 UG/1
2 INHALANT RESPIRATORY (INHALATION) EVERY 4 HOURS PRN
Qty: 8.5 EACH | Refills: 3 | Status: SHIPPED | OUTPATIENT
Start: 2025-03-27

## 2025-03-28 NOTE — PROGRESS NOTES
Memo Patel is a 35 year old male.  Chief Complaint   Patient presents with    Follow - Up     Pt states he has been getting headaches and trouble getting an erection     HPI:   35-year-old gentleman here for follow-up.  He reports that he is doing okay.  He reports some headache as he ran out of blood pressure medication.  He got a new job now.  A lot of stress.  Denies any blurry vision, weakness, chest pain or shortness of breath.  No flareup of asthma reported..  He also reports that he has erectile dysfunction and it is affecting his quality of life.      Current Outpatient Medications   Medication Sig Dispense Refill    amLODIPine 2.5 MG Oral Tab Take 1 tablet (2.5 mg total) by mouth daily. 90 tablet 1    albuterol (PROAIR HFA) 108 (90 Base) MCG/ACT Inhalation Aero Soln Inhale 2 puffs into the lungs every 4 (four) hours as needed for Wheezing. 8.5 each 3    Meloxicam 15 MG Oral Tab Take 1 tablet (15 mg total) by mouth daily as needed for Pain. 15 tablet 0    albuterol sulfate (2.5 MG/3ML) 0.083% Inhalation Nebu Soln Take by nebulization every 6 (six) hours as needed for Wheezing.        Past Medical History:    Asthma (HCC)    Essential hypertension    General medical exam    Headache disorder    Hearing loss    bilateral    Migraine      History reviewed. No pertinent surgical history.   Social History:  Social History     Socioeconomic History    Marital status: Single    Number of children: 0   Tobacco Use    Smoking status: Never     Passive exposure: Never    Smokeless tobacco: Never   Vaping Use    Vaping status: Never Used   Substance and Sexual Activity    Alcohol use: Yes     Comment: rarely    Drug use: No   Other Topics Concern     Service No    Blood Transfusions No    Caffeine Concern Yes     Comment: coffee,  energy drinks    Occupational Exposure No    Hobby Hazards No    Sleep Concern No    Stress Concern No    Weight Concern No    Special Diet No    Back Care No    Exercise No     Bike Helmet No    Seat Belt Yes    Self-Exams Yes     Social Drivers of Health     Food Insecurity: No Food Insecurity (3/27/2025)    NCSS - Food Insecurity     Worried About Running Out of Food in the Last Year: No     Ran Out of Food in the Last Year: No   Transportation Needs: No Transportation Needs (3/27/2025)    NCSS - Transportation     Lack of Transportation: No   Housing Stability: Not At Risk (3/27/2025)    NCSS - Housing/Utilities     Has Housing: Yes     Worried About Losing Housing: No     Unable to Get Utilities: No      History reviewed. No pertinent family history.   Allergies[1]     REVIEW OF SYSTEMS:   Review of Systems   Review of Systems   Constitutional: Negative for activity change, appetite change and fever.   HENT: Negative for congestion and voice change.    Respiratory: Negative for cough and shortness of breath.    Cardiovascular: Negative for chest pain.   Gastrointestinal: Negative for abdominal distention, abdominal pain and vomiting.   Genitourinary: Negative for hematuria.   Skin: Negative for wound.   Psychiatric/Behavioral: Negative for behavioral problems.   Wt Readings from Last 5 Encounters:   03/27/25 203 lb (92.1 kg)   05/17/24 192 lb 9.6 oz (87.4 kg)   12/13/23 201 lb 3.2 oz (91.3 kg)   03/03/23 196 lb (88.9 kg)   06/21/22 194 lb (88 kg)     Body mass index is 33.78 kg/m².      EXAM:   /88   Pulse 88   Temp 98.7 °F (37.1 °C) (Temporal)   Ht 5' 5\" (1.651 m)   Wt 203 lb (92.1 kg)   SpO2 97%   BMI 33.78 kg/m²   Physical Exam   Constitutional:       Appearance: Normal appearance.   HENT:      Head: Normocephalic.   Eyes:      Conjunctiva/sclera: Conjunctivae normal.   Cardiovascular:      Rate and Rhythm: Normal rate and regular rhythm.      Heart sounds: Normal heart sounds. No murmur heard.  Pulmonary:      Effort: Pulmonary effort is normal.      Breath sounds: Normal breath sounds. No rhonchi or rales.   Abdominal:      General: Bowel sounds are normal.       Palpations: Abdomen is soft.      Tenderness: There is no abdominal tenderness.   Musculoskeletal:      Cervical back: Neck supple.      Right lower leg: No edema.      Left lower leg: No edema.   Skin:     General: Skin is warm and dry.   Neurological:      General: No focal deficit present.      Mental Status: He is alert and oriented to person, place, and time. Mental status is at baseline.   Psychiatric:         Mood and Affect: Mood normal.         Behavior: Behavior normal.       ASSESSMENT AND PLAN:   1. Adult general medical exam  Labs ordered as a part of his physical.  - CBC, Platelet; No Differential  - Comp Metabolic Panel (14)  - Hemoglobin A1C  - Lipid Panel  - TSH W Reflex To Free T4  - Testosterone Total    2. Essential hypertension  Encouraged compliance with medications.  Low-salt diet.  Medication refilled.  Monitor blood pressure at home.    3. Moderate asthma without complication, unspecified whether persistent (HCC)  Stable with no flareups.    4. Erectile dysfunction, unspecified erectile dysfunction type  Encouraged patient to drink plenty of fluids.  Sleep at least 7 hours at night.  Minimize stress with breathing exercises, meditation.  Encouraged patient to exercise.  If no improvement, will try medications.  Check testosterone level as well.    Plan: As above.      The patient indicates understanding of these issues and agrees to the plan.  No follow-ups on file.    This note was prepared using Dragon Medical voice recognition dictation software. As a result errors may occur. When identified these errors have been corrected. While every attempt is made to correct errors during dictation discrepancies may still exist.       [1] No Known Allergies

## 2025-04-04 LAB
ALBUMIN/GLOBULIN RATIO: 1.4 (CALC) (ref 1–2.5)
ALBUMIN: 4.5 G/DL (ref 3.6–5.1)
ALKALINE PHOSPHATASE: 48 U/L (ref 36–130)
ALT: 13 U/L (ref 9–46)
AST: 19 U/L (ref 10–40)
BILIRUBIN, TOTAL: 0.5 MG/DL (ref 0.2–1.2)
BUN: 18 MG/DL (ref 7–25)
CALCIUM: 9.5 MG/DL (ref 8.6–10.3)
CARBON DIOXIDE: 27 MMOL/L (ref 20–32)
CHLORIDE: 103 MMOL/L (ref 98–110)
CHOL/HDLC RATIO: 3.3 (CALC)
CHOLESTEROL, TOTAL: 182 MG/DL
CREATININE: 1.09 MG/DL (ref 0.6–1.26)
EGFR: 92 ML/MIN/1.73M2
GLOBULIN: 3.3 G/DL (CALC) (ref 1.9–3.7)
GLUCOSE: 88 MG/DL (ref 65–99)
HDL CHOLESTEROL: 56 MG/DL
HEMATOCRIT: 45.9 % (ref 38.5–50)
HEMOGLOBIN A1C: 5.5 % OF TOTAL HGB
HEMOGLOBIN: 15 G/DL (ref 13.2–17.1)
LDL-CHOLESTEROL: 108 MG/DL (CALC)
MCH: 29.7 PG (ref 27–33)
MCHC: 32.7 G/DL (ref 32–36)
MCV: 90.9 FL (ref 80–100)
MPV: 10.7 FL (ref 7.5–12.5)
NON-HDL CHOLESTEROL: 126 MG/DL (CALC)
PLATELET COUNT: 281 THOUSAND/UL (ref 140–400)
POTASSIUM: 4 MMOL/L (ref 3.5–5.3)
PROTEIN, TOTAL: 7.8 G/DL (ref 6.1–8.1)
RDW: 12.9 % (ref 11–15)
RED BLOOD CELL COUNT: 5.05 MILLION/UL (ref 4.2–5.8)
SODIUM: 138 MMOL/L (ref 135–146)
TESTOSTERONE,TOTAL,MALES: 253 NG/DL (ref 250–827)
TRIGLYCERIDES: 89 MG/DL
TSH W/REFLEX TO FT4: 1.75 MIU/L (ref 0.4–4.5)
WHITE BLOOD CELL COUNT: 8.3 THOUSAND/UL (ref 3.8–10.8)

## 2025-04-07 ENCOUNTER — TELEPHONE (OUTPATIENT)
Dept: INTERNAL MEDICINE CLINIC | Facility: CLINIC | Age: 35
End: 2025-04-07

## 2025-04-07 DIAGNOSIS — N52.9 ERECTILE DYSFUNCTION, UNSPECIFIED ERECTILE DYSFUNCTION TYPE: Primary | ICD-10-CM

## 2025-04-07 NOTE — TELEPHONE ENCOUNTER
patient was in to see you on 3/27/2025 due to Erectile dysfunction. Please see patient Mychart message below. Patient will like to know what is the next step since he is still having the problem and he did have his blood test done. Please advise          Memo Patel to P Em Rn Triage (supporting Jacob Whittaker MD)         4/7/25  1:05 AM  Good evening I’m asking what do I do about the problem I’m  Still experiencing since I did my blood work        Memo Patel to P Em Rn Triage (supporting Jacob Whittaker MD)         3/26/25  5:27 AM  I can’t get erection I been trying and trying I don’t know what is going on

## 2025-04-07 NOTE — TELEPHONE ENCOUNTER
Attempted to call the patient, no answer. Went to leave voicemail and was cut short. Will send a my chart message reply from the patients message on 03/26 to the patient with the referral information.

## 2025-06-28 RX ORDER — AMLODIPINE BESYLATE 2.5 MG/1
2.5 TABLET ORAL DAILY
Qty: 90 TABLET | Refills: 1 | Status: CANCELLED | OUTPATIENT
Start: 2025-06-28

## 2025-07-01 NOTE — TELEPHONE ENCOUNTER
Please review.  Protocol failed / Has no protocol.     Requested Prescriptions   Pending Prescriptions Disp Refills    albuterol (2.5 MG/3ML) 0.083% Inhalation Nebu Soln 90 mL 0     Sig: Take by nebulization every 6 (six) hours as needed for Wheezing.       Asthma & COPD Medication Protocol Failed - 7/1/2025  4:36 PM        Failed - ACT Score greater than or equal to 20        Failed - ACT recorded in the last 12 months        Passed - Appointment in past 6 or next 3 months         Passed - Medication is active on med list

## 2025-07-02 RX ORDER — ALBUTEROL SULFATE 0.83 MG/ML
2.5 SOLUTION RESPIRATORY (INHALATION) EVERY 6 HOURS PRN
Qty: 90 ML | Refills: 1 | Status: SHIPPED | OUTPATIENT
Start: 2025-07-02

## 2025-07-14 ENCOUNTER — HOSPITAL ENCOUNTER (OUTPATIENT)
Age: 35
Discharge: HOME | End: 2025-07-14
Payer: COMMERCIAL

## 2025-07-14 ENCOUNTER — NURSE TRIAGE (OUTPATIENT)
Dept: INTERNAL MEDICINE CLINIC | Facility: CLINIC | Age: 35
End: 2025-07-14

## 2025-07-14 VITALS
OXYGEN SATURATION: 99 % | RESPIRATION RATE: 20 BRPM | TEMPERATURE: 98 F | DIASTOLIC BLOOD PRESSURE: 88 MMHG | HEART RATE: 72 BPM | SYSTOLIC BLOOD PRESSURE: 127 MMHG

## 2025-07-14 DIAGNOSIS — M79.10 MYALGIA: Primary | ICD-10-CM

## 2025-07-14 DIAGNOSIS — R53.83 FATIGUE, UNSPECIFIED TYPE: ICD-10-CM

## 2025-07-14 LAB
#MXD IC: 0.3 X10ˆ3/UL (ref 0.1–1)
ATRIAL RATE: 62 BPM
BILIRUB UR QL STRIP: NEGATIVE
BUN BLD-MCNC: 10 MG/DL (ref 7–18)
CHLORIDE BLD-SCNC: 104 MMOL/L (ref 98–112)
CLARITY UR: CLEAR
CO2 BLD-SCNC: 23 MMOL/L (ref 21–32)
COLOR UR: YELLOW
CREAT BLD-MCNC: 0.9 MG/DL (ref 0.7–1.3)
EGFRCR SERPLBLD CKD-EPI 2021: 116 ML/MIN/1.73M2 (ref 60–?)
GLUCOSE BLD-MCNC: 94 MG/DL (ref 70–99)
GLUCOSE UR STRIP-MCNC: NEGATIVE MG/DL
HCT VFR BLD AUTO: 44 % (ref 39–53)
HCT VFR BLD CALC: 48 % (ref 37–53)
HGB BLD-MCNC: 14.3 G/DL (ref 13–17.5)
HGB UR QL STRIP: NEGATIVE
ISTAT IONIZED CALCIUM FOR CHEM 8: 1.18 MMOL/L (ref 1.12–1.32)
KETONES UR STRIP-MCNC: NEGATIVE MG/DL
LEUKOCYTE ESTERASE UR QL STRIP: NEGATIVE
LYMPHOCYTES # BLD AUTO: 1.5 X10ˆ3/UL (ref 1–4)
LYMPHOCYTES NFR BLD AUTO: 28.8 %
MCH RBC QN AUTO: 28.5 PG (ref 26–34)
MCHC RBC AUTO-ENTMCNC: 32.5 G/DL (ref 31–37)
MCV RBC AUTO: 87.8 FL (ref 80–100)
MIXED CELL %: 6.3 %
NEUTROPHILS # BLD AUTO: 3.4 X10ˆ3/UL (ref 1.5–7.7)
NEUTROPHILS NFR BLD AUTO: 64.9 %
NITRITE UR QL STRIP: NEGATIVE
P AXIS: 27 DEGREES
P-R INTERVAL: 134 MS
PH UR STRIP: 6 [PH]
PLATELET # BLD AUTO: 263 X10ˆ3/UL (ref 150–450)
POTASSIUM BLD-SCNC: 3.8 MMOL/L (ref 3.6–5.1)
PROT UR STRIP-MCNC: NEGATIVE MG/DL
Q-T INTERVAL: 406 MS
QRS DURATION: 96 MS
QTC CALCULATION (BEZET): 412 MS
R AXIS: 20 DEGREES
RBC # BLD AUTO: 5.01 X10ˆ6/UL (ref 4.3–5.7)
SARS-COV-2 RNA RESP QL NAA+PROBE: NOT DETECTED
SODIUM BLD-SCNC: 140 MMOL/L (ref 136–145)
SP GR UR STRIP: 1.02
T AXIS: 13 DEGREES
TROPONIN I BLD-MCNC: <0.02 NG/ML (ref ?–0.05)
UROBILINOGEN UR STRIP-ACNC: <2 MG/DL
VENTRICULAR RATE: 62 BPM
WBC # BLD AUTO: 5.2 X10ˆ3/UL (ref 4–11)

## 2025-07-14 PROCEDURE — 84484 ASSAY OF TROPONIN QUANT: CPT | Performed by: NURSE PRACTITIONER

## 2025-07-14 PROCEDURE — 99203 OFFICE O/P NEW LOW 30 MIN: CPT | Performed by: NURSE PRACTITIONER

## 2025-07-14 PROCEDURE — 80047 BASIC METABLC PNL IONIZED CA: CPT | Performed by: NURSE PRACTITIONER

## 2025-07-14 PROCEDURE — 93000 ELECTROCARDIOGRAM COMPLETE: CPT | Performed by: NURSE PRACTITIONER

## 2025-07-14 PROCEDURE — 81002 URINALYSIS NONAUTO W/O SCOPE: CPT | Performed by: NURSE PRACTITIONER

## 2025-07-14 PROCEDURE — 85025 COMPLETE CBC W/AUTO DIFF WBC: CPT | Performed by: NURSE PRACTITIONER

## 2025-07-14 PROCEDURE — U0002 COVID-19 LAB TEST NON-CDC: HCPCS | Performed by: NURSE PRACTITIONER

## 2025-07-14 NOTE — ED PROVIDER NOTES
Patient Seen in: [facility]    History   CC: body aches  HPI: Memo Patel 35 year old male  who presents c/o generalized upper extremity myalgias beginning 7/11/2025. States this progressed to lower extremity myalgias and some overall fatigue and weakness bilat to both upper and lower extremities in the last couple of days. Provides the example that he had difficulty unbuckling his child's car seat harness d/t weakness and pain in his hands. Denies headache, muscle rigidity, tremor, fever, rash, paraesthesias or radiating pain, numbness. Denies abd pain, n/v/d, cp, koki, vision changes or dizziness. Denies swelling to extremities, recent travel or injury/trauma.     Past Medical History[1]    Past Surgical History[2]    Family History[3]    Short Social Hx on File[4]    ROS:  Systems reviewed: All pertinent positives noted in HPI. Unless otherwise noted, additional systems reviewed are negative.   Vital signs reviewed.    Positive for stated complaint: Strain, Minor  Other systems are as noted in HPI.  Constitutional and vital signs reviewed.      All other systems reviewed and negative except as noted above.    PSFH elements reviewed from today and agreed except as otherwise stated in HPI.             Constitutional and vital signs reviewed.        Physical Exam     ED Triage Vitals   BP 07/14/25 1000 127/88   Pulse 07/14/25 1000 72   Resp 07/14/25 1000 20   Temp 07/14/25 1000 97.9 °F (36.6 °C)   Temp src 07/14/25 1000 Oral   SpO2 07/14/25 1000 99 %   O2 Device 07/14/25 0959 None (Room air)       Current:/88   Pulse 72   Temp 97.9 °F (36.6 °C) (Oral)   Resp 20   SpO2 99%         PE:  General - Appears well, non-toxic and in NAD  Head - Appears symmetrical without deformity/swelling cranium, scalp, or facial bones  Eyes - PERRLA, sclera not injected, no discharge noted, no periorbital edema, no pain/difficulty with EOM  ENT - EAC bilaterally without discharge, TM pearly grey with COL  visualized appropriately bilaterally.   no nasal drainage noted in nares bilat, no cobblestoning to post. Pharynx.   Oropharynx clear, posterior pharynx is without erythema and without tonsilar enlargement or exudate, uvula midline, +gag, voice is clear. No trismus  Neck - no significant adenopathy, supple with trachea midline, no posterior cervical spine tenderness  Resp - Lung sounds clear bilaterally and wob unlabored, good aeration with equal, even expansion bilaterally   CV - RRR, no murmur  GI - Appears round and flat, BS +x4 quadrants, no tenderness/guarding with palpation  Skin - pink warm and dry throughout, mmm, no obvious signs of swelling/trauma/deformity, cap refill <2seconds  Neuro - A&O x4, sensation equal to both medial and lateral aspects of extremities, steady gait  MSK - makes purposeful movements of all extremities with full ROM noted, hand grasp strength equal bilat, radial pulses 2+ bilat.  no midline spinal tenderness or obvious sign of trauma/swelling/deformity, +flexion of the 1st and 5th toes bilat.  Psych - Interactive and appropriate      ED Course     Labs Reviewed   POCT ISTAT CHEM8 CARTRIDGE - Normal   ISTAT TROPONIN - Normal   RAPID SARS-COV-2 BY PCR - Normal   POCT CBC   EM[facility] POCT URINALYSIS DIPSTICK     EKG    Rate, intervals and axes as noted on EKG Report.  Rate: 62  Rhythm: Sinus Rhythm  Reading: NSR  No previous to compare             MDM     DDx: myositis, viral illness, covid 19, acs, electrolyte imbalance, anemia, arrhythmia, infection, MS, thyroid dysfunction, cva, spinal cord disease, Guillain-Barré, co2 poisoning     CBC and chemistry unremarkable.  Urine dip negative.  Troponin negative.  Rapid COVID PCR negative.  EKG as noted above.  Neuroexam intact.  Patient is well-appearing with stable vitals.  Afebrile. All results reviewed with patient as well as general myalgia and fatigue/weakness instructions reviewed, rest, hydration instructions, Tylenol or Motrin as needed  for discomfort, close follow-up with PCP in the next 48 hours for reevaluation as additional diagnostics/management may be warranted if symptoms do not improve.  Strict ED precautions reviewed.  Patient is historian and demonstrates understanding of all instruction and agrees with plan of care.  This case was discussed with Dr. Madison who agrees with plan of care.      Disposition and Plan     Clinical Impression:  1. Myalgia    2. Fatigue, unspecified type        Disposition:  Discharge    Follow-up:  Jacob Whittaker MD  [address] [place] [id number]  [phone]    Go in 2 days        Medications Prescribed:  Discharge Medication List as of 7/14/2025 11:08 AM                         [1]   Past Medical History:   Asthma (HCC)    Essential hypertension    General medical exam    Headache disorder    Hearing loss    bilateral    Migraine   [2] History reviewed. No pertinent surgical history.  [3] No family history on file.  [4]   Social History  Socioeconomic History    Marital status: Single    Number of children: 0   Tobacco Use    Smoking status: Never     Passive exposure: Never    Smokeless tobacco: Never   Vaping Use    Vaping status: Never Used   Substance and Sexual Activity    Alcohol use: Yes     Comment: rarely    Drug use: No   Other Topics Concern     Service No    Blood Transfusions No    Caffeine Concern Yes     Comment: coffee,  energy drinks    Occupational Exposure No    Hobby Hazards No    Sleep Concern No    Stress Concern No    Weight Concern No    Special Diet No    Back Care No    Exercise No    Bike Helmet No    Seat Belt Yes    Self-Exams Yes     Social Drivers of Health     Food Insecurity: No Food Insecurity (3/27/2025)    NCSS - Food Insecurity     Worried About Running Out of Food in the Last Year: No     Ran Out of Food in the Last Year: No   Transportation Needs: No Transportation Needs (3/27/2025)    NCSS - Transportation     Lack of Transportation: No   Housing  Stability: Not At Risk (3/27/2025)    NCSS - Housing/Utilities     Has Housing: Yes     Worried About Losing Housing: No     Unable to Get Utilities: No

## 2025-07-14 NOTE — TELEPHONE ENCOUNTER
Action Requested: Summary for Provider     []  Critical Lab, Recommendations Needed  [] Need Additional Advice  []   FYI    []   Need Orders  [] Need Medications Sent to Pharmacy  []  Other     SUMMARY: Will go to UC today    Reason for call: Musculoskeletal Problem  Onset: 3 days ago    Patient calling (verified full name and date of birth).  Complaining of bilateral axilla pain, bilater arm pain, torso pain and hand pain since 7-11-25  Denied any chest pain, shortness of breath, slurred speech or any other symptoms   Stated \" I think I slept wrong\"  No injury  Asking for muscle relaxer's  Declined appointment for today when offered different time slots  Advise UC evaluation today and agreed.      Reason for Disposition   MODERATE pain (e.g., interferes with normal activities) and present > 3 days    Protocols used: Muscle Aches and Body Pain-A-OH

## 2025-07-14 NOTE — ED INITIAL ASSESSMENT (HPI)
Pt c/o dec strength and pain to B arms and B legs x4 days.  No falls or injury.  No chest pain.  No SOB.

## 2025-07-14 NOTE — DISCHARGE INSTRUCTIONS
Make sure to stay well hydrated with clear fluids. You can use Tylenol or Motrin every 6 hours to control fever or discomfort. You can use both Tylenol and Motrin, but alternate them so that you're getting one every 3 hours. Follow up with your primary care provider in the next 2 days. Seek additional care in the ER for fever, headaches, dizziness, difficulty breathing or shortness of breath, chest pain, vomiting, swelling in your extremities, one sided weakness, rash, decreased urination or any new/worsening symptoms.

## 2025-08-11 ENCOUNTER — NURSE TRIAGE (OUTPATIENT)
Dept: INTERNAL MEDICINE CLINIC | Facility: CLINIC | Age: 35
End: 2025-08-11

## 2025-08-11 ENCOUNTER — PATIENT MESSAGE (OUTPATIENT)
Dept: INTERNAL MEDICINE CLINIC | Facility: CLINIC | Age: 35
End: 2025-08-11

## 2025-08-11 DIAGNOSIS — Z11.3 SCREENING FOR STD (SEXUALLY TRANSMITTED DISEASE): Primary | ICD-10-CM

## 2025-08-18 RX ORDER — ALBUTEROL SULFATE 0.83 MG/ML
2.5 SOLUTION RESPIRATORY (INHALATION) EVERY 6 HOURS PRN
Qty: 90 ML | Refills: 1 | Status: SHIPPED | OUTPATIENT
Start: 2025-08-18